# Patient Record
Sex: MALE | Race: WHITE | NOT HISPANIC OR LATINO | Employment: FULL TIME | URBAN - METROPOLITAN AREA
[De-identification: names, ages, dates, MRNs, and addresses within clinical notes are randomized per-mention and may not be internally consistent; named-entity substitution may affect disease eponyms.]

---

## 2024-06-05 ENCOUNTER — PHARMACY VISIT (OUTPATIENT)
Dept: PHARMACY | Facility: MEDICAL CENTER | Age: 29
End: 2024-06-05
Payer: COMMERCIAL

## 2024-06-05 ENCOUNTER — HOSPITAL ENCOUNTER (EMERGENCY)
Facility: MEDICAL CENTER | Age: 29
End: 2024-06-05
Attending: EMERGENCY MEDICINE
Payer: MEDICAID

## 2024-06-05 VITALS
RESPIRATION RATE: 16 BRPM | OXYGEN SATURATION: 97 % | WEIGHT: 127.65 LBS | BODY MASS INDEX: 19.35 KG/M2 | TEMPERATURE: 97 F | DIASTOLIC BLOOD PRESSURE: 75 MMHG | SYSTOLIC BLOOD PRESSURE: 116 MMHG | HEART RATE: 79 BPM | HEIGHT: 68 IN

## 2024-06-05 DIAGNOSIS — L03.012 CELLULITIS OF LEFT THUMB: ICD-10-CM

## 2024-06-05 DIAGNOSIS — L02.512 ABSCESS OF LEFT THUMB: ICD-10-CM

## 2024-06-05 PROCEDURE — 99282 EMERGENCY DEPT VISIT SF MDM: CPT

## 2024-06-05 PROCEDURE — RXMED WILLOW AMBULATORY MEDICATION CHARGE: Performed by: EMERGENCY MEDICINE

## 2024-06-05 RX ORDER — SULFAMETHOXAZOLE AND TRIMETHOPRIM 800; 160 MG/1; MG/1
1 TABLET ORAL 2 TIMES DAILY
Qty: 14 TABLET | Refills: 0 | Status: ACTIVE | OUTPATIENT
Start: 2024-06-05 | End: 2024-06-12

## 2024-06-05 NOTE — ED PROVIDER NOTES
ED Provider Note    CHIEF COMPLAINT  Chief Complaint   Patient presents with    Digit Pain     Pt states he thinks he got a splinter in his left thumb 7 days ago that has gotten progressively worse; moderate swelling noted to left thumb with open wound; pt denies injury       EXTERNAL RECORDS REVIEWED  Other no prior records for review, patient states recently relocated to [place]    HPI/ROS  LIMITATION TO HISTORY   Select: : None  OUTSIDE HISTORIAN(S):  None    Sivakumar Levi is a 28 y.o. male who presents to the emergency department through triage for left thumb pain, swelling.  Patient believes he got a splinter in his thumb nearly 7 days ago, increasing redness, swelling, pain since that time.  There is an open wound on the volar aspect which she states he can occasionally squeeze pus out of.  Pain with range of motion.  Denies fever.  He had similar cutaneous lesion on the left hip for which she also squeezed/expressed pus from last week which resulted in scabbing and since nearly healed per patient.  Denies fever.    PAST MEDICAL HISTORY   Denies    SURGICAL HISTORY   has a past surgical history that includes other.    FAMILY HISTORY  History reviewed. No pertinent family history.    SOCIAL HISTORY  Social History     Tobacco Use    Smoking status: Every Day     Types: Cigarettes    Smokeless tobacco: Never   Vaping Use    Vaping status: Some Days   Substance and Sexual Activity    Alcohol use: Not Currently     Comment: None since 12/17/23; previous hx of ETOH abuse    Drug use: Not Currently    Sexual activity: Not on file       CURRENT MEDICATIONS  Home Medications       Reviewed by Carrie Pruitt R.N. (Registered Nurse) on 06/05/24 at 0306  Med List Status: Not Addressed     Medication Last Dose Status        Patient Baudilio Taking any Medications                           ALLERGIES  No Known Allergies    PHYSICAL EXAM  VITAL SIGNS: /75   Pulse 79   Temp 36.1 °C (97 °F) (Temporal)   Resp 16   Ht  "1.727 m (5' 8\")   Wt 57.9 kg (127 lb 10.3 oz)   SpO2 97%   BMI 19.41 kg/m²    Pulse ox interpretation: I interpret this pulse ox as normal.  Constitutional: Alert in no apparent distress.  HENT: Normocephalic, atraumatic. Bilateral external ears normal, Nose normal.   Eyes: Conjunctiva normal.   Neck: Normal range of motion  Cardiovascular: Normal peripheral perfusion  Thorax & Lungs: Nonlabored respirations  Skin: Warm, Dry   Musculoskeletal: Left thumb is circumferentially swollen, erythematous, warm.  There is a wound on the volar medial aspect of the first phalanx, weeping.  Pain with any flexion or extension at first MTP, PIP.  Less than 2-second capillary refill.  Sensation intact distally. Mild discomfort with palpation over the thenar eminence although no significant cutaneous changes.  (Images taken although did not transfer through media for upload)  Neurologic: Alert and orient x 4.  Speech is clear and cohesive.  Ambulates independently.  Psychiatric: Affect normal, Judgment normal, Mood normal.       EKG/LABS  Refused    RADIOLOGY/PROCEDURES   Refused      COURSE & MEDICAL DECISION MAKING    ASSESSMENT, COURSE AND PLAN  Care Narrative:   Seen evaluated at bedside.  Left thumb infection, suspect abscess, developing tenosynovitis.  Recommended labs, imaging, IV antibiotics and orthopedic consultation.  However patient does not want to be admitted, declines further workup and wishes for antibiotics only.  States he has to work at 930 this morning, he is off on Saturday and Sunday and will return at that time if symptoms persist for further workup.  He is aware that and leaving at this time there is significant risk for worsening infection, permanent disability, loss of this digit or limb otherwise, or even death from sepsis.  He is quite concerned about going to work, getting paid.  Concerns attempted to be alleviated but patient is adamant that he cannot stay at this time.  He is aware that I feel " strongly that if infection goes untreated he may indeed lose his thumb.  He is agreeable to outpatient antibiotics and return when more convenient.       ADDITIONAL PROBLEMS MANAGED      DISPOSITION AND DISCUSSIONS    Discussion of management with other [facility] or appropriate source(s): Pharmacy agrees with Bactrim for soft tissue abscess, even tenosynovitis.      Escalation of care considered, and ultimately not performed:after discussion with the patient / family, they have elected to decline an escalation in care.  Refused.  AMA.    The patient is leaving AGAINST MEDICAL ADVICE.  Return encouraged as soon as possible.  Prescription provided for Bactrim.  Is conversive, reiterates the plan as well as my concerns and leaving at this time, appears to have capacity to make such decisions.      FINAL DIAGNOSIS  1. Cellulitis of left thumb    2. Abscess of left thumb           Electronically signed by: Megha Gunderson D.O., 6/5/2024 5:32 AM

## 2024-06-05 NOTE — ED TRIAGE NOTES
"Chief Complaint   Patient presents with    Digit Pain     Pt states he thinks he got a splinter in his left thumb 7 days ago that has gotten progressively worse; moderate swelling noted to left thumb with open wound; pt denies injury     Pt ambulatory to triage for above complaint. Pt states he also has similar wounds to his left hip that \"started out looking like pimples but turned into craters.\" Those wounds do not look infected at this time. Pt has hx of MRSA infection and is concerned that this might be MRSA.     Pt is alert/oriented and follows commands. Pt speaking in full sentences and responds appropriately to questions. No acute distress noted in triage and respirations are even and unlabored.     Pt placed in lobby and educated on triage process. Pt encouraged to alert staff for any changes in condition.  "

## 2024-06-05 NOTE — DISCHARGE INSTRUCTIONS
You are leaving the hospital AGAINST MEDICAL ADVICE.  Doing so without further workup and specialty consultation and operative intervention you risk permanent disability, loss of limb or life.    You may return at any time for further evaluation and encouraged to do so as soon as possible.    Bactrim twice daily for 7 days.

## 2024-06-05 NOTE — ED NOTES
"Pt discharged home, pt left and signed AMA form. Pt educated on the risks of leaving AMA, pt verbalized understanding. Pt alert and oriented, on room air, steady gait. pt in possession of belongings. Pt provided discharge education and information pertaining to medications and follow up appointments. Pt received copy of discharge instructions and verbalized understanding. /75   Pulse 79   Temp 36.1 °C (97 °F) (Temporal)   Resp 16   Ht 1.727 m (5' 8\")   Wt 57.9 kg (127 lb 10.3 oz)   SpO2 97%   BMI 19.41 kg/m²     "

## 2024-06-11 ENCOUNTER — ANESTHESIA EVENT (OUTPATIENT)
Dept: SURGERY | Facility: MEDICAL CENTER | Age: 29
DRG: 603 | End: 2024-06-11
Payer: MEDICAID

## 2024-06-11 ENCOUNTER — APPOINTMENT (OUTPATIENT)
Dept: RADIOLOGY | Facility: MEDICAL CENTER | Age: 29
DRG: 603 | End: 2024-06-11
Attending: EMERGENCY MEDICINE
Payer: MEDICAID

## 2024-06-11 ENCOUNTER — ANESTHESIA (OUTPATIENT)
Dept: SURGERY | Facility: MEDICAL CENTER | Age: 29
DRG: 603 | End: 2024-06-11
Payer: MEDICAID

## 2024-06-11 ENCOUNTER — HOSPITAL ENCOUNTER (INPATIENT)
Facility: MEDICAL CENTER | Age: 29
LOS: 1 days | DRG: 603 | End: 2024-06-12
Attending: EMERGENCY MEDICINE | Admitting: HOSPITALIST
Payer: MEDICAID

## 2024-06-11 DIAGNOSIS — S61.002A OPEN WOUND OF LEFT THUMB, INITIAL ENCOUNTER: ICD-10-CM

## 2024-06-11 DIAGNOSIS — L03.012 CELLULITIS OF LEFT THUMB: ICD-10-CM

## 2024-06-11 DIAGNOSIS — F10.920 ALCOHOLIC INTOXICATION WITHOUT COMPLICATION (HCC): ICD-10-CM

## 2024-06-11 PROBLEM — L02.512 ABSCESS OF LEFT THUMB: Status: ACTIVE | Noted: 2024-06-11

## 2024-06-11 PROBLEM — F10.20 ALCOHOL DEPENDENCE (HCC): Status: ACTIVE | Noted: 2024-06-11

## 2024-06-11 PROBLEM — E87.20 LACTIC ACIDOSIS: Status: ACTIVE | Noted: 2024-06-11

## 2024-06-11 LAB
ALBUMIN SERPL BCP-MCNC: 4.2 G/DL (ref 3.2–4.9)
ALBUMIN/GLOB SERPL: 1.4 G/DL
ALP SERPL-CCNC: 108 U/L (ref 30–99)
ALT SERPL-CCNC: 17 U/L (ref 2–50)
ANION GAP SERPL CALC-SCNC: 19 MMOL/L (ref 7–16)
AST SERPL-CCNC: 33 U/L (ref 12–45)
BASOPHILS # BLD AUTO: 0.6 % (ref 0–1.8)
BASOPHILS # BLD: 0.05 K/UL (ref 0–0.12)
BILIRUB SERPL-MCNC: 0.3 MG/DL (ref 0.1–1.5)
BUN SERPL-MCNC: 13 MG/DL (ref 8–22)
CALCIUM ALBUM COR SERPL-MCNC: 9.3 MG/DL (ref 8.5–10.5)
CALCIUM SERPL-MCNC: 9.5 MG/DL (ref 8.5–10.5)
CHLORIDE SERPL-SCNC: 103 MMOL/L (ref 96–112)
CO2 SERPL-SCNC: 19 MMOL/L (ref 20–33)
CREAT SERPL-MCNC: 0.69 MG/DL (ref 0.5–1.4)
EOSINOPHIL # BLD AUTO: 0.07 K/UL (ref 0–0.51)
EOSINOPHIL NFR BLD: 0.8 % (ref 0–6.9)
ERYTHROCYTE [DISTWIDTH] IN BLOOD BY AUTOMATED COUNT: 41.5 FL (ref 35.9–50)
ETHANOL BLD-MCNC: 302.2 MG/DL
FUNGUS SPEC FUNGUS STN: NORMAL
GFR SERPLBLD CREATININE-BSD FMLA CKD-EPI: 130 ML/MIN/1.73 M 2
GLOBULIN SER CALC-MCNC: 3.1 G/DL (ref 1.9–3.5)
GLUCOSE SERPL-MCNC: 90 MG/DL (ref 65–99)
GRAM STN SPEC: NORMAL
HCT VFR BLD AUTO: 46.8 % (ref 42–52)
HGB BLD-MCNC: 16.5 G/DL (ref 14–18)
IMM GRANULOCYTES # BLD AUTO: 0.02 K/UL (ref 0–0.11)
IMM GRANULOCYTES NFR BLD AUTO: 0.2 % (ref 0–0.9)
LACTATE SERPL-SCNC: 1.8 MMOL/L (ref 0.5–2)
LACTATE SERPL-SCNC: 2.7 MMOL/L (ref 0.5–2)
LACTATE SERPL-SCNC: 3.1 MMOL/L (ref 0.5–2)
LACTATE SERPL-SCNC: 3.7 MMOL/L (ref 0.5–2)
LYMPHOCYTES # BLD AUTO: 3.89 K/UL (ref 1–4.8)
LYMPHOCYTES NFR BLD: 45.3 % (ref 22–41)
MCH RBC QN AUTO: 30.6 PG (ref 27–33)
MCHC RBC AUTO-ENTMCNC: 35.3 G/DL (ref 32.3–36.5)
MCV RBC AUTO: 86.7 FL (ref 81.4–97.8)
MONOCYTES # BLD AUTO: 0.39 K/UL (ref 0–0.85)
MONOCYTES NFR BLD AUTO: 4.5 % (ref 0–13.4)
NEUTROPHILS # BLD AUTO: 4.17 K/UL (ref 1.82–7.42)
NEUTROPHILS NFR BLD: 48.6 % (ref 44–72)
NRBC # BLD AUTO: 0 K/UL
NRBC BLD-RTO: 0 /100 WBC (ref 0–0.2)
PLATELET # BLD AUTO: 331 K/UL (ref 164–446)
PMV BLD AUTO: 10.2 FL (ref 9–12.9)
POTASSIUM SERPL-SCNC: 3.8 MMOL/L (ref 3.6–5.5)
PROT SERPL-MCNC: 7.3 G/DL (ref 6–8.2)
RBC # BLD AUTO: 5.4 M/UL (ref 4.7–6.1)
SCCMEC + MECA PNL NOSE NAA+PROBE: NEGATIVE
SIGNIFICANT IND 70042: NORMAL
SIGNIFICANT IND 70042: NORMAL
SITE SITE: NORMAL
SITE SITE: NORMAL
SODIUM SERPL-SCNC: 141 MMOL/L (ref 135–145)
SOURCE SOURCE: NORMAL
SOURCE SOURCE: NORMAL
WBC # BLD AUTO: 8.6 K/UL (ref 4.8–10.8)

## 2024-06-11 PROCEDURE — 700101 HCHG RX REV CODE 250: Performed by: STUDENT IN AN ORGANIZED HEALTH CARE EDUCATION/TRAINING PROGRAM

## 2024-06-11 PROCEDURE — 700102 HCHG RX REV CODE 250 W/ 637 OVERRIDE(OP): Performed by: HOSPITALIST

## 2024-06-11 PROCEDURE — 96366 THER/PROPH/DIAG IV INF ADDON: CPT

## 2024-06-11 PROCEDURE — 87205 SMEAR GRAM STAIN: CPT

## 2024-06-11 PROCEDURE — HZ2ZZZZ DETOXIFICATION SERVICES FOR SUBSTANCE ABUSE TREATMENT: ICD-10-PCS | Performed by: HOSPITALIST

## 2024-06-11 PROCEDURE — 87040 BLOOD CULTURE FOR BACTERIA: CPT

## 2024-06-11 PROCEDURE — 83605 ASSAY OF LACTIC ACID: CPT

## 2024-06-11 PROCEDURE — 99291 CRITICAL CARE FIRST HOUR: CPT

## 2024-06-11 PROCEDURE — 85025 COMPLETE CBC W/AUTO DIFF WBC: CPT

## 2024-06-11 PROCEDURE — 87077 CULTURE AEROBIC IDENTIFY: CPT

## 2024-06-11 PROCEDURE — 99222 1ST HOSP IP/OBS MODERATE 55: CPT | Mod: 57 | Performed by: STUDENT IN AN ORGANIZED HEALTH CARE EDUCATION/TRAINING PROGRAM

## 2024-06-11 PROCEDURE — 73130 X-RAY EXAM OF HAND: CPT | Mod: LT

## 2024-06-11 PROCEDURE — 700102 HCHG RX REV CODE 250 W/ 637 OVERRIDE(OP): Performed by: EMERGENCY MEDICINE

## 2024-06-11 PROCEDURE — 96375 TX/PRO/DX INJ NEW DRUG ADDON: CPT

## 2024-06-11 PROCEDURE — 36415 COLL VENOUS BLD VENIPUNCTURE: CPT

## 2024-06-11 PROCEDURE — 160009 HCHG ANES TIME/MIN: Performed by: STUDENT IN AN ORGANIZED HEALTH CARE EDUCATION/TRAINING PROGRAM

## 2024-06-11 PROCEDURE — 160036 HCHG PACU - EA ADDL 30 MINS PHASE I: Performed by: STUDENT IN AN ORGANIZED HEALTH CARE EDUCATION/TRAINING PROGRAM

## 2024-06-11 PROCEDURE — 87086 URINE CULTURE/COLONY COUNT: CPT

## 2024-06-11 PROCEDURE — A9270 NON-COVERED ITEM OR SERVICE: HCPCS | Performed by: HOSPITALIST

## 2024-06-11 PROCEDURE — 87102 FUNGUS ISOLATION CULTURE: CPT

## 2024-06-11 PROCEDURE — 99223 1ST HOSP IP/OBS HIGH 75: CPT | Performed by: HOSPITALIST

## 2024-06-11 PROCEDURE — 700105 HCHG RX REV CODE 258: Performed by: HOSPITALIST

## 2024-06-11 PROCEDURE — 96367 TX/PROPH/DG ADDL SEQ IV INF: CPT

## 2024-06-11 PROCEDURE — 700111 HCHG RX REV CODE 636 W/ 250 OVERRIDE (IP): Performed by: HOSPITALIST

## 2024-06-11 PROCEDURE — 87186 SC STD MICRODIL/AGAR DIL: CPT

## 2024-06-11 PROCEDURE — 87070 CULTURE OTHR SPECIMN AEROBIC: CPT

## 2024-06-11 PROCEDURE — A9270 NON-COVERED ITEM OR SERVICE: HCPCS | Performed by: EMERGENCY MEDICINE

## 2024-06-11 PROCEDURE — 160048 HCHG OR STATISTICAL LEVEL 1-5: Performed by: STUDENT IN AN ORGANIZED HEALTH CARE EDUCATION/TRAINING PROGRAM

## 2024-06-11 PROCEDURE — 160027 HCHG SURGERY MINUTES - 1ST 30 MINS LEVEL 2: Performed by: STUDENT IN AN ORGANIZED HEALTH CARE EDUCATION/TRAINING PROGRAM

## 2024-06-11 PROCEDURE — 0X9K0ZZ DRAINAGE OF LEFT HAND, OPEN APPROACH: ICD-10-PCS | Performed by: STUDENT IN AN ORGANIZED HEALTH CARE EDUCATION/TRAINING PROGRAM

## 2024-06-11 PROCEDURE — 700105 HCHG RX REV CODE 258: Mod: UD | Performed by: EMERGENCY MEDICINE

## 2024-06-11 PROCEDURE — 87641 MR-STAPH DNA AMP PROBE: CPT

## 2024-06-11 PROCEDURE — 770001 HCHG ROOM/CARE - MED/SURG/GYN PRIV*

## 2024-06-11 PROCEDURE — 26020 DRAIN HAND TENDON SHEATH: CPT | Mod: FA | Performed by: STUDENT IN AN ORGANIZED HEALTH CARE EDUCATION/TRAINING PROGRAM

## 2024-06-11 PROCEDURE — 700105 HCHG RX REV CODE 258: Performed by: STUDENT IN AN ORGANIZED HEALTH CARE EDUCATION/TRAINING PROGRAM

## 2024-06-11 PROCEDURE — 160035 HCHG PACU - 1ST 60 MINS PHASE I: Performed by: STUDENT IN AN ORGANIZED HEALTH CARE EDUCATION/TRAINING PROGRAM

## 2024-06-11 PROCEDURE — 700111 HCHG RX REV CODE 636 W/ 250 OVERRIDE (IP): Mod: JZ | Performed by: EMERGENCY MEDICINE

## 2024-06-11 PROCEDURE — 87076 CULTURE ANAEROBE IDENT EACH: CPT

## 2024-06-11 PROCEDURE — 87015 SPECIMEN INFECT AGNT CONCNTJ: CPT

## 2024-06-11 PROCEDURE — 80053 COMPREHEN METABOLIC PANEL: CPT

## 2024-06-11 PROCEDURE — 82077 ASSAY SPEC XCP UR&BREATH IA: CPT

## 2024-06-11 PROCEDURE — 96365 THER/PROPH/DIAG IV INF INIT: CPT

## 2024-06-11 PROCEDURE — 700111 HCHG RX REV CODE 636 W/ 250 OVERRIDE (IP): Performed by: STUDENT IN AN ORGANIZED HEALTH CARE EDUCATION/TRAINING PROGRAM

## 2024-06-11 PROCEDURE — 160002 HCHG RECOVERY MINUTES (STAT): Performed by: STUDENT IN AN ORGANIZED HEALTH CARE EDUCATION/TRAINING PROGRAM

## 2024-06-11 PROCEDURE — 87075 CULTR BACTERIA EXCEPT BLOOD: CPT

## 2024-06-11 RX ORDER — MIDAZOLAM HYDROCHLORIDE 1 MG/ML
INJECTION INTRAMUSCULAR; INTRAVENOUS PRN
Status: DISCONTINUED | OUTPATIENT
Start: 2024-06-11 | End: 2024-06-11 | Stop reason: SURG

## 2024-06-11 RX ORDER — AMOXICILLIN 250 MG
2 CAPSULE ORAL EVERY EVENING
Status: DISCONTINUED | OUTPATIENT
Start: 2024-06-11 | End: 2024-06-12 | Stop reason: HOSPADM

## 2024-06-11 RX ORDER — DEXAMETHASONE SODIUM PHOSPHATE 4 MG/ML
INJECTION, SOLUTION INTRA-ARTICULAR; INTRALESIONAL; INTRAMUSCULAR; INTRAVENOUS; SOFT TISSUE PRN
Status: DISCONTINUED | OUTPATIENT
Start: 2024-06-11 | End: 2024-06-11 | Stop reason: SURG

## 2024-06-11 RX ORDER — KETOROLAC TROMETHAMINE 15 MG/ML
15 INJECTION, SOLUTION INTRAMUSCULAR; INTRAVENOUS ONCE
Status: COMPLETED | OUTPATIENT
Start: 2024-06-11 | End: 2024-06-11

## 2024-06-11 RX ORDER — DIAZEPAM 10 MG/2ML
10 INJECTION, SOLUTION INTRAMUSCULAR; INTRAVENOUS
Status: DISCONTINUED | OUTPATIENT
Start: 2024-06-11 | End: 2024-06-12 | Stop reason: HOSPADM

## 2024-06-11 RX ORDER — HYDROMORPHONE HYDROCHLORIDE 1 MG/ML
0.4 INJECTION, SOLUTION INTRAMUSCULAR; INTRAVENOUS; SUBCUTANEOUS
Status: DISCONTINUED | OUTPATIENT
Start: 2024-06-11 | End: 2024-06-11 | Stop reason: HOSPADM

## 2024-06-11 RX ORDER — LABETALOL HYDROCHLORIDE 5 MG/ML
10 INJECTION, SOLUTION INTRAVENOUS EVERY 4 HOURS PRN
Status: DISCONTINUED | OUTPATIENT
Start: 2024-06-11 | End: 2024-06-12 | Stop reason: HOSPADM

## 2024-06-11 RX ORDER — CHLORDIAZEPOXIDE HYDROCHLORIDE 25 MG/1
50 CAPSULE, GELATIN COATED ORAL EVERY 6 HOURS
Status: DISCONTINUED | OUTPATIENT
Start: 2024-06-11 | End: 2024-06-12 | Stop reason: HOSPADM

## 2024-06-11 RX ORDER — HYDROMORPHONE HYDROCHLORIDE 1 MG/ML
0.1 INJECTION, SOLUTION INTRAMUSCULAR; INTRAVENOUS; SUBCUTANEOUS
Status: DISCONTINUED | OUTPATIENT
Start: 2024-06-11 | End: 2024-06-11 | Stop reason: HOSPADM

## 2024-06-11 RX ORDER — OXYCODONE HYDROCHLORIDE 5 MG/1
5 TABLET ORAL
Status: DISCONTINUED | OUTPATIENT
Start: 2024-06-11 | End: 2024-06-12 | Stop reason: HOSPADM

## 2024-06-11 RX ORDER — POLYETHYLENE GLYCOL 3350 17 G/17G
1 POWDER, FOR SOLUTION ORAL
Status: DISCONTINUED | OUTPATIENT
Start: 2024-06-11 | End: 2024-06-12 | Stop reason: HOSPADM

## 2024-06-11 RX ORDER — ONDANSETRON 2 MG/ML
4 INJECTION INTRAMUSCULAR; INTRAVENOUS
Status: DISCONTINUED | OUTPATIENT
Start: 2024-06-11 | End: 2024-06-11 | Stop reason: HOSPADM

## 2024-06-11 RX ORDER — CHLORDIAZEPOXIDE HYDROCHLORIDE 25 MG/1
25 CAPSULE, GELATIN COATED ORAL EVERY 6 HOURS
Status: DISCONTINUED | OUTPATIENT
Start: 2024-06-12 | End: 2024-06-12 | Stop reason: HOSPADM

## 2024-06-11 RX ORDER — SODIUM CHLORIDE, SODIUM LACTATE, POTASSIUM CHLORIDE, AND CALCIUM CHLORIDE .6; .31; .03; .02 G/100ML; G/100ML; G/100ML; G/100ML
500 INJECTION, SOLUTION INTRAVENOUS
Status: DISCONTINUED | OUTPATIENT
Start: 2024-06-11 | End: 2024-06-12 | Stop reason: HOSPADM

## 2024-06-11 RX ORDER — LIDOCAINE HYDROCHLORIDE 20 MG/ML
INJECTION, SOLUTION EPIDURAL; INFILTRATION; INTRACAUDAL; PERINEURAL PRN
Status: DISCONTINUED | OUTPATIENT
Start: 2024-06-11 | End: 2024-06-11 | Stop reason: SURG

## 2024-06-11 RX ORDER — ACETAMINOPHEN 325 MG/1
650 TABLET ORAL EVERY 6 HOURS PRN
Status: DISCONTINUED | OUTPATIENT
Start: 2024-06-11 | End: 2024-06-12 | Stop reason: HOSPADM

## 2024-06-11 RX ORDER — ONDANSETRON 2 MG/ML
INJECTION INTRAMUSCULAR; INTRAVENOUS PRN
Status: DISCONTINUED | OUTPATIENT
Start: 2024-06-11 | End: 2024-06-11 | Stop reason: SURG

## 2024-06-11 RX ORDER — LORAZEPAM 1 MG/1
1 TABLET ORAL EVERY 4 HOURS PRN
Status: DISCONTINUED | OUTPATIENT
Start: 2024-06-11 | End: 2024-06-12 | Stop reason: HOSPADM

## 2024-06-11 RX ORDER — HYDROMORPHONE HYDROCHLORIDE 1 MG/ML
0.2 INJECTION, SOLUTION INTRAMUSCULAR; INTRAVENOUS; SUBCUTANEOUS
Status: DISCONTINUED | OUTPATIENT
Start: 2024-06-11 | End: 2024-06-11 | Stop reason: HOSPADM

## 2024-06-11 RX ORDER — LORAZEPAM 2 MG/1
4 TABLET ORAL
Status: DISCONTINUED | OUTPATIENT
Start: 2024-06-11 | End: 2024-06-12 | Stop reason: HOSPADM

## 2024-06-11 RX ORDER — SODIUM CHLORIDE 9 MG/ML
INJECTION, SOLUTION INTRAVENOUS CONTINUOUS
Status: DISCONTINUED | OUTPATIENT
Start: 2024-06-11 | End: 2024-06-12 | Stop reason: HOSPADM

## 2024-06-11 RX ORDER — SODIUM CHLORIDE, SODIUM LACTATE, POTASSIUM CHLORIDE, AND CALCIUM CHLORIDE .6; .31; .03; .02 G/100ML; G/100ML; G/100ML; G/100ML
30 INJECTION, SOLUTION INTRAVENOUS ONCE
Status: DISCONTINUED | OUTPATIENT
Start: 2024-06-11 | End: 2024-06-11

## 2024-06-11 RX ORDER — SODIUM CHLORIDE 9 MG/ML
30 INJECTION, SOLUTION INTRAVENOUS ONCE
Status: COMPLETED | OUTPATIENT
Start: 2024-06-11 | End: 2024-06-11

## 2024-06-11 RX ORDER — NICOTINE 21 MG/24HR
14 PATCH, TRANSDERMAL 24 HOURS TRANSDERMAL
Status: DISCONTINUED | OUTPATIENT
Start: 2024-06-11 | End: 2024-06-11

## 2024-06-11 RX ORDER — PHENYLEPHRINE HCL IN 0.9% NACL 1 MG/10 ML
SYRINGE (ML) INTRAVENOUS PRN
Status: DISCONTINUED | OUTPATIENT
Start: 2024-06-11 | End: 2024-06-11 | Stop reason: SURG

## 2024-06-11 RX ORDER — HALOPERIDOL 5 MG/ML
1 INJECTION INTRAMUSCULAR
Status: DISCONTINUED | OUTPATIENT
Start: 2024-06-11 | End: 2024-06-11 | Stop reason: HOSPADM

## 2024-06-11 RX ORDER — NICOTINE 21 MG/24HR
14 PATCH, TRANSDERMAL 24 HOURS TRANSDERMAL
Status: DISCONTINUED | OUTPATIENT
Start: 2024-06-11 | End: 2024-06-12 | Stop reason: HOSPADM

## 2024-06-11 RX ORDER — LORAZEPAM 1 MG/1
0.5 TABLET ORAL EVERY 4 HOURS PRN
Status: DISCONTINUED | OUTPATIENT
Start: 2024-06-11 | End: 2024-06-12 | Stop reason: HOSPADM

## 2024-06-11 RX ORDER — SODIUM CHLORIDE, SODIUM LACTATE, POTASSIUM CHLORIDE, CALCIUM CHLORIDE 600; 310; 30; 20 MG/100ML; MG/100ML; MG/100ML; MG/100ML
INJECTION, SOLUTION INTRAVENOUS
Status: DISCONTINUED | OUTPATIENT
Start: 2024-06-11 | End: 2024-06-11 | Stop reason: SURG

## 2024-06-11 RX ORDER — GAUZE BANDAGE 2" X 2"
100 BANDAGE TOPICAL DAILY
Status: DISCONTINUED | OUTPATIENT
Start: 2024-06-11 | End: 2024-06-12 | Stop reason: HOSPADM

## 2024-06-11 RX ORDER — HYDROMORPHONE HYDROCHLORIDE 1 MG/ML
0.25 INJECTION, SOLUTION INTRAMUSCULAR; INTRAVENOUS; SUBCUTANEOUS
Status: DISCONTINUED | OUTPATIENT
Start: 2024-06-11 | End: 2024-06-12 | Stop reason: HOSPADM

## 2024-06-11 RX ORDER — LORAZEPAM 2 MG/1
2 TABLET ORAL
Status: DISCONTINUED | OUTPATIENT
Start: 2024-06-11 | End: 2024-06-12 | Stop reason: HOSPADM

## 2024-06-11 RX ORDER — FOLIC ACID 1 MG/1
1 TABLET ORAL DAILY
Status: DISCONTINUED | OUTPATIENT
Start: 2024-06-11 | End: 2024-06-12 | Stop reason: HOSPADM

## 2024-06-11 RX ORDER — OXYCODONE HYDROCHLORIDE 5 MG/1
2.5 TABLET ORAL
Status: DISCONTINUED | OUTPATIENT
Start: 2024-06-11 | End: 2024-06-12 | Stop reason: HOSPADM

## 2024-06-11 RX ORDER — KETOROLAC TROMETHAMINE 15 MG/ML
INJECTION, SOLUTION INTRAMUSCULAR; INTRAVENOUS PRN
Status: DISCONTINUED | OUTPATIENT
Start: 2024-06-11 | End: 2024-06-11 | Stop reason: SURG

## 2024-06-11 RX ADMIN — MIDAZOLAM HYDROCHLORIDE 2 MG: 2 INJECTION, SOLUTION INTRAMUSCULAR; INTRAVENOUS at 16:48

## 2024-06-11 RX ADMIN — NICOTINE 14 MG: 14 PATCH TRANSDERMAL at 09:41

## 2024-06-11 RX ADMIN — VANCOMYCIN HYDROCHLORIDE 1000 MG: 5 INJECTION, POWDER, LYOPHILIZED, FOR SOLUTION INTRAVENOUS at 20:50

## 2024-06-11 RX ADMIN — PROPOFOL 180 MG: 10 INJECTION, EMULSION INTRAVENOUS at 16:48

## 2024-06-11 RX ADMIN — ONDANSETRON 4 MG: 2 INJECTION INTRAMUSCULAR; INTRAVENOUS at 16:53

## 2024-06-11 RX ADMIN — FOLIC ACID 1 MG: 1 TABLET ORAL at 10:20

## 2024-06-11 RX ADMIN — NICOTINE POLACRILEX 2 MG: 2 GUM, CHEWING BUCCAL at 22:18

## 2024-06-11 RX ADMIN — THERA TABS 1 TABLET: TAB at 10:20

## 2024-06-11 RX ADMIN — AMPICILLIN AND SULBACTAM 3 G: 1; 2 INJECTION, POWDER, FOR SOLUTION INTRAMUSCULAR; INTRAVENOUS at 07:45

## 2024-06-11 RX ADMIN — Medication 100 MG: at 10:20

## 2024-06-11 RX ADMIN — SODIUM CHLORIDE 1614 ML: 9 INJECTION, SOLUTION INTRAVENOUS at 08:22

## 2024-06-11 RX ADMIN — NICOTINE POLACRILEX 2 MG: 2 GUM, CHEWING BUCCAL at 21:15

## 2024-06-11 RX ADMIN — LORAZEPAM 1 MG: 1 TABLET ORAL at 22:18

## 2024-06-11 RX ADMIN — DEXAMETHASONE SODIUM PHOSPHATE 4 MG: 4 INJECTION INTRA-ARTICULAR; INTRALESIONAL; INTRAMUSCULAR; INTRAVENOUS; SOFT TISSUE at 16:53

## 2024-06-11 RX ADMIN — SODIUM CHLORIDE: 9 INJECTION, SOLUTION INTRAVENOUS at 20:29

## 2024-06-11 RX ADMIN — FENTANYL CITRATE 50 MCG: 50 INJECTION, SOLUTION INTRAMUSCULAR; INTRAVENOUS at 16:48

## 2024-06-11 RX ADMIN — VANCOMYCIN HYDROCHLORIDE 1 G: 1 INJECTION, POWDER, LYOPHILIZED, FOR SOLUTION INTRAVENOUS at 16:53

## 2024-06-11 RX ADMIN — CHLORDIAZEPOXIDE HYDROCHLORIDE 50 MG: 25 CAPSULE ORAL at 11:35

## 2024-06-11 RX ADMIN — CHLORDIAZEPOXIDE HYDROCHLORIDE 50 MG: 25 CAPSULE ORAL at 23:27

## 2024-06-11 RX ADMIN — LIDOCAINE HYDROCHLORIDE 100 MG: 20 INJECTION, SOLUTION EPIDURAL; INFILTRATION; INTRACAUDAL at 16:48

## 2024-06-11 RX ADMIN — SODIUM CHLORIDE, POTASSIUM CHLORIDE, SODIUM LACTATE AND CALCIUM CHLORIDE: 600; 310; 30; 20 INJECTION, SOLUTION INTRAVENOUS at 16:45

## 2024-06-11 RX ADMIN — VANCOMYCIN HYDROCHLORIDE 1250 MG: 5 INJECTION, POWDER, LYOPHILIZED, FOR SOLUTION INTRAVENOUS at 08:22

## 2024-06-11 RX ADMIN — KETOROLAC TROMETHAMINE 15 MG: 15 INJECTION, SOLUTION INTRAMUSCULAR; INTRAVENOUS at 17:00

## 2024-06-11 RX ADMIN — NICOTINE 14 MG: 14 PATCH TRANSDERMAL at 22:11

## 2024-06-11 RX ADMIN — LORAZEPAM 2 MG: 2 TABLET ORAL at 10:38

## 2024-06-11 RX ADMIN — SODIUM CHLORIDE: 9 INJECTION, SOLUTION INTRAVENOUS at 10:25

## 2024-06-11 RX ADMIN — Medication 100 MCG: at 16:55

## 2024-06-11 RX ADMIN — KETOROLAC TROMETHAMINE 15 MG: 15 INJECTION, SOLUTION INTRAMUSCULAR; INTRAVENOUS at 09:41

## 2024-06-11 SDOH — ECONOMIC STABILITY: TRANSPORTATION INSECURITY
IN THE PAST 12 MONTHS, HAS LACK OF RELIABLE TRANSPORTATION KEPT YOU FROM MEDICAL APPOINTMENTS, MEETINGS, WORK OR FROM GETTING THINGS NEEDED FOR DAILY LIVING?: NO

## 2024-06-11 SDOH — ECONOMIC STABILITY: TRANSPORTATION INSECURITY
IN THE PAST 12 MONTHS, HAS THE LACK OF TRANSPORTATION KEPT YOU FROM MEDICAL APPOINTMENTS OR FROM GETTING MEDICATIONS?: NO

## 2024-06-11 ASSESSMENT — LIFESTYLE VARIABLES
HEADACHE, FULLNESS IN HEAD: NOT PRESENT
VISUAL DISTURBANCES: VERY MILD SENSITIVITY
ANXIETY: NO ANXIETY (AT EASE)
PAROXYSMAL SWEATS: NO SWEAT VISIBLE
ORIENTATION AND CLOUDING OF SENSORIUM: ORIENTED AND CAN DO SERIAL ADDITIONS
ORIENTATION AND CLOUDING OF SENSORIUM: ORIENTED AND CAN DO SERIAL ADDITIONS
ALCOHOL_USE: YES
VISUAL DISTURBANCES: NOT PRESENT
TOTAL SCORE: 4
CONSUMPTION TOTAL: POSITIVE
ANXIETY: *
TREMOR: NO TREMOR
DOES PATIENT WANT TO STOP DRINKING: NO
ON A TYPICAL DAY WHEN YOU DRINK ALCOHOL HOW MANY DRINKS DO YOU HAVE: 2
VISUAL DISTURBANCES: NOT PRESENT
HEADACHE, FULLNESS IN HEAD: MILD
TOTAL SCORE: 0
EVER FELT BAD OR GUILTY ABOUT YOUR DRINKING: NO
NAUSEA AND VOMITING: MILD NAUSEA WITH NO VOMITING
HAVE YOU EVER FELT YOU SHOULD CUT DOWN ON YOUR DRINKING: NO
PAROXYSMAL SWEATS: BARELY PERCEPTIBLE SWEATING. PALMS MOIST
AGITATION: NORMAL ACTIVITY
NAUSEA AND VOMITING: NO NAUSEA AND NO VOMITING
ANXIETY: MODERATELY ANXIOUS OR GUARDED, SO ANXIETY IS INFERRED
TREMOR: *
ORIENTATION AND CLOUDING OF SENSORIUM: ORIENTED AND CAN DO SERIAL ADDITIONS
AUDITORY DISTURBANCES: NOT PRESENT
NAUSEA AND VOMITING: NO NAUSEA AND NO VOMITING
VISUAL DISTURBANCES: NOT PRESENT
NAUSEA AND VOMITING: NO NAUSEA AND NO VOMITING
HAVE PEOPLE ANNOYED YOU BY CRITICIZING YOUR DRINKING: NO
AVERAGE NUMBER OF DAYS PER WEEK YOU HAVE A DRINK CONTAINING ALCOHOL: 5
HEADACHE, FULLNESS IN HEAD: NOT PRESENT
AGITATION: NORMAL ACTIVITY
TREMOR: *
AGITATION: MODERATELY FIDGETY AND RESTLESS
NAUSEA AND VOMITING: NO NAUSEA AND NO VOMITING
EVER HAD A DRINK FIRST THING IN THE MORNING TO STEADY YOUR NERVES TO GET RID OF A HANGOVER: NO
TREMOR: NO TREMOR
HEADACHE, FULLNESS IN HEAD: NOT PRESENT
HEADACHE, FULLNESS IN HEAD: NOT PRESENT
VISUAL DISTURBANCES: NOT PRESENT
ANXIETY: *
AUDITORY DISTURBANCES: NOT PRESENT
TOTAL SCORE: 10
TREMOR: *
PAROXYSMAL SWEATS: BARELY PERCEPTIBLE SWEATING. PALMS MOIST
ANXIETY: NO ANXIETY (AT EASE)
PAROXYSMAL SWEATS: NO SWEAT VISIBLE
AUDITORY DISTURBANCES: NOT PRESENT
TOTAL SCORE: 1
ORIENTATION AND CLOUDING OF SENSORIUM: ORIENTED AND CAN DO SERIAL ADDITIONS
TOTAL SCORE: 0
HOW MANY TIMES IN THE PAST YEAR HAVE YOU HAD 5 OR MORE DRINKS IN A DAY: 2
TOTAL SCORE: 12
TOTAL SCORE: 0
PAROXYSMAL SWEATS: BARELY PERCEPTIBLE SWEATING. PALMS MOIST
TOTAL SCORE: 0
AUDITORY DISTURBANCES: NOT PRESENT
AUDITORY DISTURBANCES: NOT PRESENT
AGITATION: *
ORIENTATION AND CLOUDING OF SENSORIUM: ORIENTED AND CAN DO SERIAL ADDITIONS
AGITATION: NORMAL ACTIVITY

## 2024-06-11 ASSESSMENT — COGNITIVE AND FUNCTIONAL STATUS - GENERAL
DRESSING REGULAR LOWER BODY CLOTHING: A LITTLE
MOBILITY SCORE: 17
WALKING IN HOSPITAL ROOM: A LOT
HELP NEEDED FOR BATHING: A LITTLE
DAILY ACTIVITIY SCORE: 20
MOVING FROM LYING ON BACK TO SITTING ON SIDE OF FLAT BED: A LITTLE
TOILETING: A LITTLE
DRESSING REGULAR UPPER BODY CLOTHING: A LITTLE
CLIMB 3 TO 5 STEPS WITH RAILING: A LOT
SUGGESTED CMS G CODE MODIFIER DAILY ACTIVITY: CJ
MOVING TO AND FROM BED TO CHAIR: A LITTLE
STANDING UP FROM CHAIR USING ARMS: A LITTLE
SUGGESTED CMS G CODE MODIFIER MOBILITY: CK

## 2024-06-11 ASSESSMENT — PATIENT HEALTH QUESTIONNAIRE - PHQ9
1. LITTLE INTEREST OR PLEASURE IN DOING THINGS: NOT AT ALL
SUM OF ALL RESPONSES TO PHQ9 QUESTIONS 1 AND 2: 0
SUM OF ALL RESPONSES TO PHQ9 QUESTIONS 1 AND 2: 0
1. LITTLE INTEREST OR PLEASURE IN DOING THINGS: NOT AT ALL
2. FEELING DOWN, DEPRESSED, IRRITABLE, OR HOPELESS: NOT AT ALL
2. FEELING DOWN, DEPRESSED, IRRITABLE, OR HOPELESS: NOT AT ALL

## 2024-06-11 ASSESSMENT — SOCIAL DETERMINANTS OF HEALTH (SDOH)
WITHIN THE PAST 12 MONTHS, YOU WORRIED THAT YOUR FOOD WOULD RUN OUT BEFORE YOU GOT THE MONEY TO BUY MORE: OFTEN TRUE
WITHIN THE LAST YEAR, HAVE YOU BEEN AFRAID OF YOUR PARTNER OR EX-PARTNER?: NO
WITHIN THE LAST YEAR, HAVE YOU BEEN KICKED, HIT, SLAPPED, OR OTHERWISE PHYSICALLY HURT BY YOUR PARTNER OR EX-PARTNER?: NO
WITHIN THE PAST 12 MONTHS, THE FOOD YOU BOUGHT JUST DIDN'T LAST AND YOU DIDN'T HAVE MONEY TO GET MORE: SOMETIMES TRUE
WITHIN THE LAST YEAR, HAVE YOU BEEN HUMILIATED OR EMOTIONALLY ABUSED IN OTHER WAYS BY YOUR PARTNER OR EX-PARTNER?: NO
IN THE PAST 12 MONTHS, HAS THE ELECTRIC, GAS, OIL, OR WATER COMPANY THREATENED TO SHUT OFF SERVICE IN YOUR HOME?: YES
WITHIN THE LAST YEAR, HAVE TO BEEN RAPED OR FORCED TO HAVE ANY KIND OF SEXUAL ACTIVITY BY YOUR PARTNER OR EX-PARTNER?: NO

## 2024-06-11 ASSESSMENT — FIBROSIS 4 INDEX
FIB4 SCORE: 0.65
FIB4 SCORE: 0.65

## 2024-06-11 ASSESSMENT — PAIN DESCRIPTION - PAIN TYPE: TYPE: ACUTE PAIN;SURGICAL PAIN

## 2024-06-11 NOTE — ED NOTES
Pt here for wound check on left thumb. Pt was seen here on 6/5 for the same and was given ABX in which he has been noncompliant. Pt signed out AMA on 6/5 as well. Pt had unknown amount of vodka overnight. NAD noted. Awaiting ERP to see.

## 2024-06-11 NOTE — CONSULTS
"6/11/2024    Time Called: 1400  Time Arrived: 1630      HPI: Sivakumar Levi is a 28 y.o. male who presents with left thumb wound for several weeks to months.  He is recently admitted but left AMA stating he could heal it on its own.  He has not been taking his oral antibiotics that he was prescribed.    History reviewed. No pertinent past medical history.    Past Surgical History:   Procedure Laterality Date    OTHER      small tumor removed from throat as a baby       Medications  No current facility-administered medications on file prior to encounter.     Current Outpatient Medications on File Prior to Encounter   Medication Sig Dispense Refill    sulfamethoxazole-trimethoprim (BACTRIM DS) 800-160 MG tablet Take 1 Tablet by mouth 2 times a day for 7 days. 14 Tablet 0       Allergies  Patient has no known allergies.    ROS  . All other systems were reviewed and found to be negative    History reviewed. No pertinent family history.    Social History     Socioeconomic History    Marital status: Single   Tobacco Use    Smoking status: Every Day     Types: Cigarettes    Smokeless tobacco: Never   Vaping Use    Vaping status: Some Days   Substance and Sexual Activity    Alcohol use: Yes     Comment: None since 12/17/23; previous hx of ETOH abuse    Drug use: Not Currently       Physical Exam  Vitals  /71   Pulse 78   Temp 35.9 °C (96.7 °F) (Temporal)   Resp 15   Ht 1.727 m (5' 8\")   Wt 53.8 kg (118 lb 9.7 oz)   SpO2 96%   General: Well Developed, Well Nourished, Age appropriate appearance  HEENT: Normocephalic, atraumatic  Psych: Normal mood and affect  Neck: Supple, nontender, no masses  Lungs: Breathing unlabored, No audible wheezing  Heart: Regular heart rate and rhythm  Abdomen: Soft, NT, ND  Neuro: Sensation grossly intact to BUE and BLE, moving all four extremities  Skin: Draining wound left thumb  Vascular: , Capillary refill <2 seconds  MSK: Left thumb: Large ulceration with draining wound over " the ulnar aspect of the mid thumb.  Surrounding erythema noted.  Sensation tact distally.  Motion intact flexion extension of the IP and MCP joints.      Radiographs:  DX-HAND 3+ LEFT   Final Result      Soft tissue swelling without acute osseous abnormality.          Laboratory Values  Recent Labs     06/11/24  0731   WBC 8.6   RBC 5.40   HEMOGLOBIN 16.5   HEMATOCRIT 46.8   MCV 86.7   MCH 30.6   MCHC 35.3   RDW 41.5   PLATELETCT 331   MPV 10.2     Recent Labs     06/11/24  0731   SODIUM 141   POTASSIUM 3.8   CHLORIDE 103   CO2 19*   GLUCOSE 90   BUN 13             Impression: 28-year-old male history of alcohol abuse with a month-long history of left thumb abscess draining wound.  Plan for I&D today.  We discussed this is significant infection may require multiple operations.  Will acquire additional IV antibiotics and likely discharge on oral antibiotics.  Likely require significant wound care as well.    Plan:We discussed the diagnosis and findings with the patient at length.  We reviewed possible non operative and operative interventions and the risks and benefits of each of these.  he had a chance to ask questions and all of these were answered to his satisfaction. The patient chose to proceed with surgical intervention. Risks and benefits of surgery were discussed which include but are not limited to bleeding, infection, neurovascular damage, malunion, nonunion, instability, limb length discrepancy, DVT, PE, MI, Stroke and death. They understand these risks and wish to proceed.      Oscar Bullock MD  Orthopedic Trauma Surgery

## 2024-06-11 NOTE — ED TRIAGE NOTES
"Chief Complaint   Patient presents with    Wound Check     Pt has wound to left thumb. Reports he has MRSA. Was seen 6/5/2 for same complaint. He signed out AMA & was prescribed bactrim. Has been non-complaint with medications.     Alcohol Intoxication     Pt was drinking vodka last night unknown amount.      BIB EMS. EMS was called by PD. Pt was found yelling and acting aggressive. VS: 140/90, , Resp 18, SPO2 96% RA, GCS 15, BG 95.     Pt states he wants to have his thumb cut off because he has dishes to wash. Pt A & O x 4.     Pt ambulates from [facility] with steady gait. Skin signs pink, warm, dry. Pt speaking full sentences, A & O x 4. Respirations equal and unlabored. Pt educated on triage process and to alert staff of any changing conditions. Pt returned to the [facility].     BP (!) 130/91   Pulse (!) 112   Temp 36.8 °C (98.2 °F) (Temporal)   Resp 16   Ht 1.727 m (5' 8\")   Wt 53.8 kg (118 lb 9.7 oz)   SpO2 98%   BMI 18.03 kg/m²       "

## 2024-06-11 NOTE — ED NOTES
"Med rec is complete per Patient at bedside.  Per Pt, he \"only took 2\" tablets of his antibiotic then \"stopped because he was drinking\".  Allergies reviewed.    Has patient had any outside antibiotics in the last 30 days? Y    Any Anticoagulants (rivaroxaban, apixaban, edoxaban, dabigatran, warfarin, enoxaparin) taken in the last 14 days? N         Pharmacy/Pharmacies Pt utilizes : [facility]         "

## 2024-06-11 NOTE — ANESTHESIA PREPROCEDURE EVALUATION
Case: [id number] Date/Time: 06/11/24 1633    Procedure: IRRIGATION AND DEBRIDEMENT, WOUND (Left: Thumb)    Location: [facility] OR 16 / SURGERY [facility]    Surgeons: Oscar Bullock M.D.          29yo M w/ EtOH use, presented today w/ left thumb infection. NPO. Lactate down to 1.8 from 3.7. .  Relevant Problems   No relevant active problems       Physical Exam    Airway   Mallampati: II  TM distance: >3 FB  Neck ROM: full       Cardiovascular - normal exam  Rhythm: regular  Rate: normal  (-) murmur     Dental - normal exam           Pulmonary - normal exam  Breath sounds clear to auscultation     Abdominal    Neurological - normal exam                   Anesthesia Plan    ASA 3   ASA physical status 3 criteria: alcohol and/or substance dependence or abuse    Plan - general       Airway plan will be LMA          Induction: intravenous    Postoperative Plan: Postoperative administration of opioids is intended.    Pertinent diagnostic labs and testing reviewed    Informed Consent:    Anesthetic plan and risks discussed with patient.    Use of blood products discussed with: patient whom consented to blood products.

## 2024-06-11 NOTE — PROGRESS NOTES
Pharmacy Vancomycin Kinetics Note for 6/11/2024     28 y.o. male on Vancomycin day # 1     Vancomycin Indication (AUC Dosing): Skin/skin structure infection      Provider specified end date: 06/16/24    Active Antibiotics (From admission, onward)      Ordered     Ordering Provider       Tue Jun 11, 2024  9:42 AM    06/11/24 0942  MD Alert...Vancomycin per Pharmacy  PRN        Question:  Indication(s) for vancomycin?  Answer:  Skin and soft tissue infection    Cade Romero M.D.       Tue Jun 11, 2024  7:20 AM    06/11/24 0720  vancomycin (Vancocin) 1,250 mg in  mL IVPB  (vancomycin (VANCOCIN) IV (LD + Maintenance))  ONCE         Kat Min M.D.            Dosing Weight: 53 kg (116 lb 13.5 oz)      Admission History: Admitted on 6/11/2024 for Abscess of left thumb [L02.512]  Pertinent history: 28 year old started on vancomycin    Allergies:     Patient has no known allergies.     Pertinent cultures to date:     Results       Procedure Component Value Units Date/Time    CULTURE WOUND W/ GRAM STAIN [[id number]]     Order Status: Sent Specimen: Wound from Left Hand     Blood Culture - Draw one from central line and one from peripheral site [[id number]] Collected: 06/11/24 0731    Order Status: Sent Specimen: Blood from Line Updated: 06/11/24 0926    Blood Culture - Draw one from central line and one from peripheral site [[id number]] Collected: 06/11/24 0745    Order Status: Sent Specimen: Blood from Peripheral Updated: 06/11/24 0924    MRSA By PCR (Amp) [[id number]] Collected: 06/11/24 0731    Order Status: Sent Specimen: Blood from Nares Updated: 06/11/24 0832    Urine Culture (New) [[id number]]     Order Status: Sent Specimen: Urine             Labs:     Estimated Creatinine Clearance: 122.4 mL/min (by C-G formula based on SCr of 0.69 mg/dL).  Recent Labs     06/11/24 0731   WBC 8.6   NEUTSPOLYS 48.60     Recent Labs     06/11/24 0731   BUN 13   CREATININE 0.69   ALBUMIN 4.2       Intake/Output Summary  "(Last 24 hours) at 2024 1055  Last data filed at 2024 1000  Gross per 24 hour   Intake 1714 ml   Output --   Net 1714 ml      /81   Pulse 82   Temp 36.8 °C (98.2 °F) (Temporal)   Resp 17   Ht 1.727 m (5' 8\")   Wt 53.8 kg (118 lb 9.7 oz)   SpO2 96%  Temp (24hrs), Av.8 °C (98.2 °F), Min:36.8 °C (98.2 °F), Max:36.8 °C (98.2 °F)      List concerns for Vancomycin clearance:          Pharmacokinetics:     AUC kinetics:   Ke (hr ^-1): 0.104 hr^-1  Half life: 6.66 hr  Clearance: 3.583  Estimated TDD: 1791.5  Estimated Dose: 649  Estimated interval: 8.7        A/P:     -  Vancomycin dose: 1250 mg IV loading dose followed by 1000 mg IV every 12 hours    -  Next vancomycin level(s): defer    -  Predicted vancomycin AUC from initial AUC test calculator: 558 mg·hr/L      -  Comments: follow up on MRSA willian Conway, PharmD    "

## 2024-06-11 NOTE — ED PROVIDER NOTES
"ED Provider Note    Scribed for Kat Min M.D. by Tim Coulter. 6/11/2024, 7:08 AM.    Primary care provider: Pcp Pt States None  Means of arrival: EMS  History obtained from: Patient  History limited by: None    CHIEF COMPLAINT  Chief Complaint   Patient presents with    Wound Check     Pt has wound to left thumb. Reports he has MRSA. Was seen 6/5/2 for same complaint. He signed out AMA & was prescribed bactrim. Has been non-complaint with medications.     Alcohol Intoxication     Pt was drinking vodka last night unknown amount.        [facility] King Gurmeet is a 28 y.o. male who presents to the Emergency Department via EMS for worsening left thumb pain and alcohol intoxication. Patient states \"I have MRSA, and I am not able to wash dishes with my thumb anymore.\"  Patient states that he has had a left thumb wound for approximately a month and it is getting worse.  He was offered hospitalization on his last ER visit but decided to leave AMA believing it could heal on its own. He was prescribed Bactrim and states he has not been taking it as he did not want to mix alcohol and antibiotics.      EXTERNAL RECORDS REVIEWED  Seen here for left thumb pain and swelling on 6/5/24. Was recommended to be hospitalized but left AMA.     LIMITATION TO HISTORY   Select: : None    OUTSIDE HISTORIAN(S):  None      PAST MEDICAL HISTORY       SURGICAL HISTORY   has a past surgical history that includes other.    SOCIAL HISTORY  Social History     Tobacco Use    Smoking status: Every Day     Types: Cigarettes    Smokeless tobacco: Never   Vaping Use    Vaping status: Some Days   Substance Use Topics    Alcohol use: Yes     Comment: None since 12/17/23; previous hx of ETOH abuse    Drug use: Not Currently      Social History     Substance and Sexual Activity   Drug Use Not Currently       FAMILY HISTORY  History reviewed. No pertinent family history.    CURRENT MEDICATIONS  Home Medications       Reviewed by Megha LIMA" "Thomas Heaton (Pharmacy Tech) on 06/11/24 at 0949  Med List Status: Complete     Medication Last Dose Status   sulfamethoxazole-trimethoprim (BACTRIM DS) 800-160 MG tablet 6/6/2024 Active                  Audit from Redirected Encounters    **Home medications have not yet been reviewed for this encounter**         ALLERGIES  No Known Allergies    PHYSICAL EXAM  VITAL SIGNS: BP (!) 130/91   Pulse (!) 112   Temp 36.8 °C (98.2 °F) (Temporal)   Resp 16   Ht 1.727 m (5' 8\")   Wt 53.8 kg (118 lb 9.7 oz)   SpO2 98%   BMI 18.03 kg/m²   Vitals reviewed by myself.  Physical Exam  Nursing note and vitals reviewed.  Constitutional: Well-developed and well-nourished.  Mild distress  HENT: Head is normocephalic and atraumatic.  Eyes: extra-ocular movements intact  Cardiovascular: Tachycardic, regular rhythm. No murmur heard.  Pulmonary/Chest: Breath sounds normal. No wheezes or rales.   Musculoskeletal: Left thumb notable for open draining wound, with associated surrounding erythema and swelling.  Patient is still able to fully range his thumb at the MCP and interphalangeal joint.  See clinical image below. Extremities exhibit normal range of motion without edema or tenderness.   Neurological: Awake and alert, sensation intact all distal fingertips  Skin: Skin is warm and dry. No rash.       DIAGNOSTIC STUDIES:  LABS  Labs Reviewed   LACTIC ACID - Abnormal; Notable for the following components:       Result Value    Lactic Acid 3.7 (*)     All other components within normal limits   CBC WITH DIFFERENTIAL - Abnormal; Notable for the following components:    Lymphocytes 45.30 (*)     All other components within normal limits   COMP METABOLIC PANEL - Abnormal; Notable for the following components:    Co2 19 (*)     Anion Gap 19.0 (*)     Alkaline Phosphatase 108 (*)     All other components within normal limits   DIAGNOSTIC ALCOHOL - Abnormal; Notable for the following components:    Diagnostic Alcohol 302.2 (*)     All other " components within normal limits   ESTIMATED GFR   LACTIC ACID   LACTIC ACID   URINE CULTURE(NEW)   BLOOD CULTURE   BLOOD CULTURE   MRSA BY PCR (AMP)   CULTURE WOUND W/ GRAM STAIN   LACTIC ACID       All labs reviewed and independently interpreted by myself    RADIOLOGY  Images independently interpreted by myself :  -No acute bony abnormality, soft tissue swelling of the left thumb  Final interpretation by radiology demonstrates:    DX-HAND 3+ LEFT   Final Result      Soft tissue swelling without acute osseous abnormality.        The radiologist's interpretation of all radiological studies have been reviewed by me.      COURSE & MEDICAL DECISION MAKING      INITIAL ASSESSMENT, ED COURSE AND PLAN    Patient is a 28-year-old male who presents for evaluation of left thumb wound.  Differential diagnosis includes cellulitis, abscess, flexor tenosynovitis, alcohol intoxication.  Patient is tachycardic with obvious infection on exam, sepsis protocol initiated.  Patient empirically started on Unasyn and vancomycin and 30 cc/kg IV fluid bolus.    Patient initial vitals notable for tachycardia, he is receiving IV fluids.  Patient given Toradol for his pain in his left thumb.  Clinically this seems to be an abscess that is now opened up, may require further debridement.  Labs are notable for lactic acid of 3.7 for which she is receiving IV fluid resuscitation and IV antibiotics.  Patient is also noted to be intoxicated with alcohol level of 302.  Case discussed with orthopedics who will evaluate patient for possible surgical intervention, Dr. Bullock plans for possible debridement this afternoon.  This time patient will be hospitalized for ongoing management of left thumb cellulitis and possible persistent abscess.  Patient hospitalized in guarded condition.       REASSESSMENTS   7:08 AM - Patient seen and examined at bedside. Discussed plan of care, including ordering labs/imaging. Patient agrees to the plan of care.     9:20  AM - I discussed the patient's case and the above findings with Dr. Bullock (Ortho) who agrees to consult.      9:29 AM - I discussed the patient's case and the above findings with Dr. Daniel Romero (Hospitalist) who agrees to evaluate the patient for hospitalization.     HYDRATION: Based on the patient's presentation of Sepsis the patient was given IV fluids. IV Hydration was used because oral hydration was not adequate alone. Upon recheck following hydration, the patient was improved.        DISPOSITION AND DISCUSSIONS  I have discussed management of the patient with the following physicians and ELLIS's:  Dr. Bullock (Ortho), Dr. Daniel Romero (Hospitalist)     Discussion of management with other [facility] or appropriate source(s): none     Escalation of care considered, and ultimately not performed:see above    Barriers to care at this time, including but not limited to: none.     Decision tools and prescription drugs considered including, but not limited to: see above.    Please see review of records as noted above    DISPOSITION:  Patient will be hospitalized by Dr. Daniel Romero in guarded condition.    FINAL IMPRESSION  1. Cellulitis of left thumb    2. Alcoholic intoxication without complication (HCC)    3. Open wound of left thumb, initial encounter          ITim (Stoneyibfabián), am scribing for, and in the presence of, Kat Min M.D..    Electronically signed by: Tim Coulter (Ivana), 6/11/2024    Kat ARENAS M.D. personally performed the services described in this documentation, as scribed by Tim Coulter in my presence, and it is both accurate and complete.    The note accurately reflects work and decisions made by me.  Kat Min M.D.  6/11/2024  12:16 PM

## 2024-06-11 NOTE — ED NOTES
Bedside report received from off going RN: Savana. Assumed care of patient.  POC discussed with patient. Call light within reach, all needs addressed at this time.       Fall risk interventions in place: Not Applicable   Continuous monitoring: Cardiac Leads, Pulse Ox, or Blood Pressure  IVF/IV medications: Not Applicable   Oxygen: Room Air  Bedside sitter: Not Applicable   Isolation: Not Applicable

## 2024-06-12 VITALS
SYSTOLIC BLOOD PRESSURE: 102 MMHG | HEIGHT: 68 IN | OXYGEN SATURATION: 95 % | HEART RATE: 80 BPM | BODY MASS INDEX: 16.47 KG/M2 | DIASTOLIC BLOOD PRESSURE: 65 MMHG | WEIGHT: 108.69 LBS | TEMPERATURE: 99 F | RESPIRATION RATE: 16 BRPM

## 2024-06-12 LAB
ALBUMIN SERPL BCP-MCNC: 3.8 G/DL (ref 3.2–4.9)
ALBUMIN/GLOB SERPL: 1.4 G/DL
ALP SERPL-CCNC: 94 U/L (ref 30–99)
ALT SERPL-CCNC: 21 U/L (ref 2–50)
ANION GAP SERPL CALC-SCNC: 11 MMOL/L (ref 7–16)
AST SERPL-CCNC: 31 U/L (ref 12–45)
BASOPHILS # BLD AUTO: 0.2 % (ref 0–1.8)
BASOPHILS # BLD: 0.01 K/UL (ref 0–0.12)
BILIRUB SERPL-MCNC: 1.3 MG/DL (ref 0.1–1.5)
BUN SERPL-MCNC: 14 MG/DL (ref 8–22)
CALCIUM ALBUM COR SERPL-MCNC: 9.2 MG/DL (ref 8.5–10.5)
CALCIUM SERPL-MCNC: 9 MG/DL (ref 8.5–10.5)
CHLORIDE SERPL-SCNC: 104 MMOL/L (ref 96–112)
CO2 SERPL-SCNC: 24 MMOL/L (ref 20–33)
CREAT SERPL-MCNC: 0.63 MG/DL (ref 0.5–1.4)
EOSINOPHIL # BLD AUTO: 0 K/UL (ref 0–0.51)
EOSINOPHIL NFR BLD: 0 % (ref 0–6.9)
ERYTHROCYTE [DISTWIDTH] IN BLOOD BY AUTOMATED COUNT: 42.6 FL (ref 35.9–50)
GFR SERPLBLD CREATININE-BSD FMLA CKD-EPI: 133 ML/MIN/1.73 M 2
GLOBULIN SER CALC-MCNC: 2.8 G/DL (ref 1.9–3.5)
GLUCOSE SERPL-MCNC: 145 MG/DL (ref 65–99)
HCT VFR BLD AUTO: 43.5 % (ref 42–52)
HGB BLD-MCNC: 14.8 G/DL (ref 14–18)
IMM GRANULOCYTES # BLD AUTO: 0.02 K/UL (ref 0–0.11)
IMM GRANULOCYTES NFR BLD AUTO: 0.4 % (ref 0–0.9)
LYMPHOCYTES # BLD AUTO: 0.46 K/UL (ref 1–4.8)
LYMPHOCYTES NFR BLD: 8.5 % (ref 22–41)
MAGNESIUM SERPL-MCNC: 1.7 MG/DL (ref 1.5–2.5)
MCH RBC QN AUTO: 30.2 PG (ref 27–33)
MCHC RBC AUTO-ENTMCNC: 34 G/DL (ref 32.3–36.5)
MCV RBC AUTO: 88.8 FL (ref 81.4–97.8)
MONOCYTES # BLD AUTO: 0.1 K/UL (ref 0–0.85)
MONOCYTES NFR BLD AUTO: 1.8 % (ref 0–13.4)
NEUTROPHILS # BLD AUTO: 4.83 K/UL (ref 1.82–7.42)
NEUTROPHILS NFR BLD: 89.1 % (ref 44–72)
NRBC # BLD AUTO: 0 K/UL
NRBC BLD-RTO: 0 /100 WBC (ref 0–0.2)
PHOSPHATE SERPL-MCNC: 2.7 MG/DL (ref 2.5–4.5)
PLATELET # BLD AUTO: 264 K/UL (ref 164–446)
PMV BLD AUTO: 10.4 FL (ref 9–12.9)
POTASSIUM SERPL-SCNC: 4.5 MMOL/L (ref 3.6–5.5)
PROT SERPL-MCNC: 6.6 G/DL (ref 6–8.2)
RBC # BLD AUTO: 4.9 M/UL (ref 4.7–6.1)
SODIUM SERPL-SCNC: 139 MMOL/L (ref 135–145)
WBC # BLD AUTO: 5.4 K/UL (ref 4.8–10.8)

## 2024-06-12 PROCEDURE — 80053 COMPREHEN METABOLIC PANEL: CPT

## 2024-06-12 PROCEDURE — 83735 ASSAY OF MAGNESIUM: CPT

## 2024-06-12 PROCEDURE — 36415 COLL VENOUS BLD VENIPUNCTURE: CPT

## 2024-06-12 PROCEDURE — 84100 ASSAY OF PHOSPHORUS: CPT

## 2024-06-12 PROCEDURE — 700102 HCHG RX REV CODE 250 W/ 637 OVERRIDE(OP): Performed by: HOSPITALIST

## 2024-06-12 PROCEDURE — A9270 NON-COVERED ITEM OR SERVICE: HCPCS | Performed by: HOSPITALIST

## 2024-06-12 PROCEDURE — 85025 COMPLETE CBC W/AUTO DIFF WBC: CPT

## 2024-06-12 RX ADMIN — LORAZEPAM 1 MG: 1 TABLET ORAL at 02:03

## 2024-06-12 RX ADMIN — ACETAMINOPHEN 650 MG: 325 TABLET, FILM COATED ORAL at 02:03

## 2024-06-12 RX ADMIN — NICOTINE POLACRILEX 2 MG: 2 GUM, CHEWING BUCCAL at 01:36

## 2024-06-12 ASSESSMENT — LIFESTYLE VARIABLES
AGITATION: SOMEWHAT MORE THAN NORMAL ACTIVITY
HEADACHE, FULLNESS IN HEAD: MILD
ORIENTATION AND CLOUDING OF SENSORIUM: ORIENTED AND CAN DO SERIAL ADDITIONS
VISUAL DISTURBANCES: NOT PRESENT
PAROXYSMAL SWEATS: BARELY PERCEPTIBLE SWEATING. PALMS MOIST
ANXIETY: *
NAUSEA AND VOMITING: MILD NAUSEA WITH NO VOMITING
AUDITORY DISTURBANCES: NOT PRESENT
TOTAL SCORE: 9
TREMOR: *

## 2024-06-12 NOTE — PROGRESS NOTES
4 Eyes Skin Assessment Completed by MIRELLA Lopez and MIRELLA Ryder.    Head WDL  Ears WDL  Nose WDL  Mouth WDL  Neck WDL  Breast/Chest WDL  Shoulder Blades Redness and Blanching  Spine Redness and Blanching  (R) Arm/Elbow/Hand Redness and Blanching  (L) Arm/Elbow/Hand Redness and Blanching  Abdomen WDL  Groin WDL L hip: rashes, wound  Scrotum/Coccyx/Buttocks Redness and Blanching, sacral optifoam applied  (R) Leg WDL  (L) Leg WDL  (R) Heel/Foot/Toe WDL  (L) Heel/Foot/Toe WDL          Devices In Places Blood Pressure Cuff, pulse ox      Interventions In Place Sacral Mepilex, Pillows, and Barrier Cream    Possible Skin Injury Yes    Pictures Uploaded Into Epic Yes  Wound Consult Placed Yes  RN Wound Prevention Protocol Ordered Yes

## 2024-06-12 NOTE — PROGRESS NOTES
Pt's PIV removed. Pt walked out on the unit in stable condition. Educated pt that he needs to take his antibiotics for his left  thumb wound s/p I and D. Pt said he needs to be in his apartment ( last day today) so that he can get his clothes ( 3 bags) before he goes to care campus this morning.

## 2024-06-12 NOTE — DISCHARGE SUMMARY
Discharge Summary    CHIEF COMPLAINT ON ADMISSION  Chief Complaint   Patient presents with    Wound Check     Pt has wound to left thumb. Reports he has MRSA. Was seen 6/5/2 for same complaint. He signed out AMA & was prescribed bactrim. Has been non-complaint with medications.     Alcohol Intoxication     Pt was drinking vodka last night unknown amount.        Reason for Admission  Finger lac     Admission Date  6/11/2024    CODE STATUS  Prior    HPI & HOSPITAL COURSE  diaz Levi is a 28 y.o. male who presented 6/11/2024 with left thumb wound for 1 month he was seen in the emergency department recommended admission however he declined and was prescribed Bactrim.  He took the Bactrim for only 2 doses.  He presents back to the emergency department for persistent drainage and pain from his left thumb no fever no chills.  Patient drinks hard liquor daily he reports he drinks about 36 ounces a day.  He denies any alcohol drawl seizures.   He was admitted started on librium evaluated by orthopedics and underwent I&D. Patient decided to leave [place] after surgery.  I tried to call the patient twice this morning to check on him and advise him to come back for wound recheck but he did not answer, I left him a message to [facility] for wound recheck.

## 2024-06-12 NOTE — ANESTHESIA POSTPROCEDURE EVALUATION
Patient: Sivakumar Levi    Procedure Summary       Date: 06/11/24 Room / Location: [place] [address] / SURGERY [facility]    Anesthesia Start: 1645 Anesthesia Stop: 1716    Procedure: IRRIGATION AND DEBRIDEMENT, LEFT THUMB ABSCESS (Left: Thumb) Diagnosis: (LEFT THUMB ABSCESS)    Surgeons: Oscar Bullock M.D. Responsible Provider: Darian Colón M.D.    Anesthesia Type: general ASA Status: 3            Final Anesthesia Type: general  Last vitals  BP   Blood Pressure: 128/64    Temp   36.7 °C (98.1 °F)    Pulse   89   Resp   18    SpO2   93 %      Anesthesia Post Evaluation    Patient location during evaluation: PACU  Patient participation: complete - patient participated  Level of consciousness: awake and alert    Airway patency: patent  Anesthetic complications: no  Cardiovascular status: hemodynamically stable  Respiratory status: acceptable  Hydration status: euvolemic    PONV: none          No notable events documented.     Nurse Pain Score: 1 (NPRS)

## 2024-06-12 NOTE — ANESTHESIA TIME REPORT
Anesthesia Start and Stop Event Times       Date Time Event    6/11/2024 1644 Ready for Procedure     1645 Anesthesia Start     1716 Anesthesia Stop          Responsible Staff  06/11/24      Name Role Begin End    Min SURAJ Colón M.D. Anesth 1643 1718          Overtime Reason:  no overtime (within assigned shift)    Comments:

## 2024-06-12 NOTE — PROGRESS NOTES
Pt is verbalizing wants to go AMA. Education provided on importance of IV Abx. Notified the provider LETY Neil

## 2024-06-12 NOTE — OP REPORT
DATE OF OPERATION: 6/12/2024     PREOPERATIVE DIAGNOSIS: Left thumb abscess    POSTOPERATIVE DIAGNOSIS: Same    PROCEDURE PERFORMED: Left thumb I&D of abscess    SURGEON: Oscar Bullock M.D.     ASSISTANT: None    ANESTHESIA: General    SPECIMEN: None    ESTIMATED BLOOD LOSS: 5 mL    IMPLANTS: None      INDICATIONS: The patient is a 28 y.o. male who presented with left thumb abscess.  I discussed the risks and benefits of the procedure which include but are not limited to risks of infection, wound healing complication, neurovascular injury, pain, malunion, non-union, malrotation, and the medical risks of anesthesia including MI, stroke, and death.  Alternatives to surgery were also discussed, including non-operative management, which I did not recommend.  The patient was in agreement with the plan to proceed, and the informed consent was signed and documented.  I met with the patient pre-operatively and marked the operative extremity with their agreement.  We proceeded to the operating room.     DESCRIPTION OF PROCEDURE:  Patient was seen in the preoperative holding area on the day of surgery. The operative site was marked with my initials.  he was taken to the operating room and placed supine on the operative table.  Anesthesia was induced.  The operative extremity was prepped and draped in the normal sterile fashion.  Operative pause was conducted and the correct patient, site, side, procedure, and surgeon's initials on extremity were identified.  There is a 1 cm draining abscess over the ulnar aspect of his thumb.  This was incised small amount of purulent fluid was released and sent for culture.  Remaining wound was thoroughly debrided using a curette.  This did not appear to track down to the flexor tendon sheath.  The wound was thoroughly irrigated with sterile saline.  The remaining tissue appeared to be healthy.  There is a moderate amount of missing skin.  Soft tissue was released allowing for some  excursion.  This was loosely closed with 3-0 nylon suture.  Sterile dressings applied he is awoken taken to PACU stable condition.    POSTOPERATIVE PLAN: Nonweightbearing left hand.  Antibiotics per ID.  The patient will follow up in clinic in 2 weeks to check wounds and remove sutures/staples.      ____________________________________   Oscar Bullock M.D.   DD: 6/12/2024  1:52 PM

## 2024-06-12 NOTE — H&P
Hospital Medicine History & Physical Note    Date of Service  6/11/2024    Primary Care Physician  Pcp Pt States None    Consultants  orthopedics    Specialist Names: Dr Bullock    Code Status  Full Code    Chief Complaint  Chief Complaint   Patient presents with    Wound Check     Pt has wound to left thumb. Reports he has MRSA. Was seen 6/5/2 for same complaint. He signed out AMA & was prescribed bactrim. Has been non-complaint with medications.     Alcohol Intoxication     Pt was drinking vodka last night unknown amount.        History of Presenting Illness  Sivakumar Levi is a 28 y.o. male who presented 6/11/2024 with left thumb wound for 1 month he was seen in the emergency department recommended admission however he declined and was prescribed Bactrim.  He took the Bactrim for only 2 doses.  He presents back to the emergency department for persistent drainage and pain from his left thumb no fever no chills.  Patient drinks hard liquor daily he reports he drinks about 36 ounces a day.  He denies any alcohol drawl seizures.    I discussed the plan of care with patient and ED physician .    Review of Systems  Review of Systems   All other systems reviewed and are negative.      Past Medical History  Negative per patient    Surgical History  Negative per patient.    Family History    Family history reviewed with patient. There is no family history that is pertinent to the chief complaint.     Social History   reports that he has been smoking cigarettes. He has never used smokeless tobacco. He reports current alcohol use. He reports that he does not currently use drugs.    Allergies  No Known Allergies    Medications  Prior to Admission Medications   Prescriptions Last Dose Informant Patient Reported? Taking?   sulfamethoxazole-trimethoprim (BACTRIM DS) 800-160 MG tablet 6/6/2024 at stopped  No Yes   Sig: Take 1 Tablet by mouth 2 times a day for 7 days.      Facility-Administered Medications: None       Physical  "Exam  Temp:  [35.9 °C (96.7 °F)-36.8 °C (98.2 °F)] 36.4 °C (97.5 °F)  Pulse:  [] 75  Resp:  [15-20] 16  BP: (100-136)/() 103/58  SpO2:  [90 %-98 %] 98 %  Blood Pressure: 103/58   Temperature: 36.4 °C (97.5 °F)   Pulse: 75   Respiration: 16   Pulse Oximetry: 98 %       Physical Exam  Vitals and nursing note reviewed.   HENT:      Head: Normocephalic.   Eyes:      General:         Right eye: No discharge.         Left eye: No discharge.   Cardiovascular:      Rate and Rhythm: Normal rate and regular rhythm.      Heart sounds: No murmur heard.  Abdominal:      General: Bowel sounds are normal.      Palpations: Abdomen is soft.      Tenderness: There is no abdominal tenderness.   Musculoskeletal:         General: No swelling.   Skin:     Comments: Left thumb dressed with purulent drainage noted   Neurological:      General: No focal deficit present.      Mental Status: He is alert.      Cranial Nerves: No cranial nerve deficit.      Motor: No weakness.      Comments: Tremors noted more   Psychiatric:         Mood and Affect: Mood normal.         Behavior: Behavior normal.         Laboratory:  Recent Labs     06/11/24  0731   WBC 8.6   RBC 5.40   HEMOGLOBIN 16.5   HEMATOCRIT 46.8   MCV 86.7   MCH 30.6   MCHC 35.3   RDW 41.5   PLATELETCT 331   MPV 10.2     Recent Labs     06/11/24  0731   SODIUM 141   POTASSIUM 3.8   CHLORIDE 103   CO2 19*   GLUCOSE 90   BUN 13   CREATININE 0.69   CALCIUM 9.5     Recent Labs     06/11/24  0731   ALTSGPT 17   ASTSGOT 33   ALKPHOSPHAT 108*   TBILIRUBIN 0.3   GLUCOSE 90         No results for input(s): \"NTPROBNP\" in the last 72 hours.      No results for input(s): \"TROPONINT\" in the last 72 hours.    Imaging:  DX-HAND 3+ LEFT   Final Result      Soft tissue swelling without acute osseous abnormality.               Assessment/Plan:  Justification for Admission Status  I anticipate this patient will require at least two midnights for appropriate medical management, necessitating " inpatient admission because left thumb abscess that will require surgical drainage and patient will likely progress to alcohol withdrawal    Patient will need a Med/Surg bed on MEDICAL service .  The need is secondary to left thumb abscess.    * Abscess of left thumb- (present on admission)  Assessment & Plan  Orthopedics consulted for evaluation for I&D  I reviewed x-ray negative for acute pathology  Concern for MRSA infection patient will be started on IV vancomycin and will follow-up on cultures    Lactic acidosis  Assessment & Plan  Likely related to alcoholism  IV fluids for hydration and repeat lactic acid ordered    Alcohol dependence (HCC)  Assessment & Plan  With early withdrawal symptoms  I ordered tapering dose of Librium  I ordered thiamine and folic acid  I ordered lorazepam per CIWA protocol        VTE prophylaxis: SCDs/TEDs l

## 2024-06-13 ENCOUNTER — PHARMACY VISIT (OUTPATIENT)
Dept: PHARMACY | Facility: MEDICAL CENTER | Age: 29
End: 2024-06-13
Payer: COMMERCIAL

## 2024-06-13 ENCOUNTER — HOSPITAL ENCOUNTER (EMERGENCY)
Facility: MEDICAL CENTER | Age: 29
End: 2024-06-13
Attending: EMERGENCY MEDICINE
Payer: MEDICAID

## 2024-06-13 VITALS
TEMPERATURE: 97.1 F | HEIGHT: 67 IN | DIASTOLIC BLOOD PRESSURE: 89 MMHG | HEART RATE: 82 BPM | OXYGEN SATURATION: 98 % | RESPIRATION RATE: 16 BRPM | BODY MASS INDEX: 19.27 KG/M2 | WEIGHT: 122.8 LBS | SYSTOLIC BLOOD PRESSURE: 143 MMHG

## 2024-06-13 DIAGNOSIS — B95.62 INFECTION OF SKIN DUE TO METHICILLIN RESISTANT STAPHYLOCOCCUS AUREUS (MRSA): ICD-10-CM

## 2024-06-13 DIAGNOSIS — L08.9 INFECTION OF SKIN DUE TO METHICILLIN RESISTANT STAPHYLOCOCCUS AUREUS (MRSA): ICD-10-CM

## 2024-06-13 DIAGNOSIS — L02.512 ABSCESS OF LEFT THUMB: ICD-10-CM

## 2024-06-13 LAB
BACTERIA TISS AEROBE CULT: ABNORMAL
BACTERIA TISS AEROBE CULT: ABNORMAL
BACTERIA UR CULT: NORMAL
GRAM STN SPEC: ABNORMAL
SIGNIFICANT IND 70042: ABNORMAL
SIGNIFICANT IND 70042: NORMAL
SITE SITE: ABNORMAL
SITE SITE: NORMAL
SOURCE SOURCE: ABNORMAL
SOURCE SOURCE: NORMAL

## 2024-06-13 PROCEDURE — RXMED WILLOW AMBULATORY MEDICATION CHARGE: Performed by: EMERGENCY MEDICINE

## 2024-06-13 PROCEDURE — A9270 NON-COVERED ITEM OR SERVICE: HCPCS | Mod: UD | Performed by: EMERGENCY MEDICINE

## 2024-06-13 PROCEDURE — 99283 EMERGENCY DEPT VISIT LOW MDM: CPT

## 2024-06-13 PROCEDURE — 700102 HCHG RX REV CODE 250 W/ 637 OVERRIDE(OP): Mod: UD | Performed by: EMERGENCY MEDICINE

## 2024-06-13 RX ORDER — LINEZOLID 600 MG/1
600 TABLET, FILM COATED ORAL 2 TIMES DAILY
Qty: 20 TABLET | Refills: 0 | Status: ACTIVE | OUTPATIENT
Start: 2024-06-13 | End: 2024-06-23

## 2024-06-13 RX ORDER — LINEZOLID 600 MG/1
600 TABLET, FILM COATED ORAL ONCE
Status: COMPLETED | OUTPATIENT
Start: 2024-06-13 | End: 2024-06-13

## 2024-06-13 RX ADMIN — LINEZOLID 600 MG: 600 TABLET, FILM COATED ORAL at 08:14

## 2024-06-13 ASSESSMENT — FIBROSIS 4 INDEX: FIB4 SCORE: 0.69

## 2024-06-13 NOTE — ED TRIAGE NOTES
"Pt ambulated into triage sts he is here to \"finish getting this fixed so I can go back to work\" referring to Left hand wound. Pt also sts \"I jovany this paper signed\" pt is A&O x's 4 and ambulatory  "

## 2024-06-13 NOTE — ED NOTES
This RN talked to SW per ERP request. ERP advising admit for iv abx however pt does not want to lose bed at [facility].

## 2024-06-13 NOTE — ED NOTES
Pt ambulatory to [facility] with steady gait. Poor historian about mechanism of injury however states needs 'this all fixed in the next hour  and a work note for Monday so I can keep my job'   Pt reports getting stitches to thumb however leaving prior to abx.   Pt's home bandage removed from hand for wound evaluation by ERP. Chart up for Erp review.

## 2024-06-13 NOTE — ED PROVIDER NOTES
Emergency Physician Note    Chief Concern:  Chief Complaint   Patient presents with    Wound Check     Left hand wound         External Records Reviewed:  1.  Inpatient medical records reviewed: Hospital medicine physician discharge summary was reviewed from yesterday, 6/12/2024.  Patient initially presented to the emergency department 6/11/2024 with a left thumb wound, reportedly present for 1 month.  Admission was recommended, he was prescribed Bactrim.  He took the Bactrim for 2 doses, and declined admission at that time.  He returned to the emergency department for persistent drainage from the left thumb.  Does report a history of heavy alcohol use.  He was started on Librium, evaluated by orthopedics and underwent incision and drainage.  He elected to leave AGAINST MEDICAL ADVICE after surgery.  Hospital medicine physician left a message for the patient to return for wound recheck.     2.  Inpatient medical records reviewed: Orthopedic surgeon consultation note reviewed from 6/11/2024.  Examination of the left thumb was significant for draining wound.  He was taken to the operating room for incision and drainage.  Recommendation was for antibiotics per infectious disease with follow-up appointment in 2 weeks for recheck.      HPI/ROS     HPI:  Sivakumar Leiv is a 28 y.o. male who presents to the emergency department today for evaluation of a left thumb wound, he is postop day 2 status post operative I&D of a right thumb abscess.  He was initially hospitalized with plan to follow-up cultures, and continue IV antibiotics, however shortly after the procedure he elected to leave AGAINST MEDICAL ADVICE.  He had been contacted by our hospitalist physician and encouraged to return for wound recheck, so he came back to the emergency department today.  He states the wound looks good, he has not noticed any abnormal drainage.  He is not currently on any antibiotics, as he left before he was able to receive an  "outpatient prescription when he left the hospital AGAINST MEDICAL ADVICE.  He is very concerned about losing his bed in the [facility], and is also concerned about returning to work.  He reports no fevers, no worsening pain, does not currently have any scheduled follow-up with primary care, nor orthopedics.    PAST MEDICAL HISTORY  History reviewed. No pertinent past medical history.    SURGICAL HISTORY  Past Surgical History:   Procedure Laterality Date    IRRIGATION & DEBRIDEMENT GENERAL Left 6/11/2024    Procedure: IRRIGATION AND DEBRIDEMENT, LEFT THUMB ABSCESS;  Surgeon: Oscar Bullock M.D.;  Location: SURGERY [facility];  Service: Orthopedics    OTHER      small tumor removed from throat as a baby       FAMILY HISTORY  History reviewed. No pertinent family history.    SOCIAL HISTORY   reports that he has been smoking cigarettes. He has never used smokeless tobacco. He reports current alcohol use. He reports that he does not currently use drugs.    CURRENT MEDICATIONS  Discharge Medication List as of 6/13/2024  8:39 AM          ALLERGIES  Patient has no known allergies.    PHYSICAL EXAM  Vital Signs: BP (!) 143/89   Pulse 82   Temp 36.2 °C (97.1 °F) (Temporal)   Resp 16   Ht 1.702 m (5' 7\")   Wt 55.7 kg (122 lb 12.7 oz)   SpO2 98%   BMI 19.23 kg/m²   Constitutional: Alert, no acute distress  HENT: Atraumatic  Neck: Normal range of motion  Cardiovascular: Left distal thumb warm, well perfused, normal capillary refill time  Pulmonary: Normal work of breathing  Skin: Incision site is normal-appearing on the left thumb, no purulent drainage or discharge, no surrounding cellulitis, sutures are in place and intact, no significant swelling of the digit, no evidence of flexor tenosynovitis  Musculoskeletal: Abnormalities as documented in skin exam  Neurologic: Left thumb with normal sensory function along radial and ulnar aspect of the digit, motor function intact    Diagnostic Studies & " Procedures    Course and Medical Decision Making    Initial Assessment and Plan:    Mr. Levi presents to the emergency department today with wound recheck.  He has no concerns about the wound, states it is healing well.  I did review the initial plan for admission and IV antibiotics until wound cultures have resulted, he adamantly declines admission at this time. I reviewed the prior labs, MRSA PCR resulted positive on 6/11/2024.     His admitting hospitalist is on-call today, I was able to contact him and discuss the best plan as the patient is declining inpatient admission again.  Recommendation is for discharge on Zyvox, as we do not yet have complete cultures.  Hospitalist requests call back if the patient changes his mind and is amenable to admission, and he will  admit the patient at that time.    His orthopedic surgeon is Dr. Bullock, I placed a referral to orthopedics as well as provided follow-up information for Dr. Bullock for wound recheck and suture removal as recommended in his operative report.  I have also provided follow-up information for primary care, who may assist the patient with outpatient wound care if necessary.  Prescription for Zyvox was sent to the pharmacy, first dose was given in the emergency department.    Patient expressed concern that he required a work note.  He is asking me to sign a paper that list his duties as a , this does not appear to be a medical form.  Additionally, with a likely open MRSA sore on the hand, I do not believe the patient should be working as a  in  at this time.    As the patient was concerned about losing his housing at the [facility], I spoke with case management.   states there is no way to verify the patient will have a shelter bed available at time of discharge.  She was able to obtain the preauthorization necessary for the patient to receive his Zyvox.    He understands he is welcome and encouraged to  return to the emergency department at any time.  Risks and benefits of declining admission were discussed and understood by the patient.  He does appear to have full decision-making capacity.  Outpatient medications and follow-up plan were provided.  Return precautions were discussed with the patient, and provided in written form with the patient's discharge instructions.     Additional Problems and Disposition    I have discussed management of the patient with the following physicians:   Dr. Daniel Bowser, Hosptialist    Discussion of management with other Qualified Healthcare Professionals or appropriate source(s):   Yen ALTMAN RN Case Manager    Escalation of care considered, and ultimately not performed:   Hospitalization -recommended, and declined by the patient as documented above.    Barriers to care at this time, including but not limited to:   Housing Instability -patient is reluctant to undergo hospital admission out of concern for losing his shelter bed, case management was contacted to explore alternative options    Prescription medication considerations and management:  1.  Zyvox prescription provided    Disposition:  Discharged AGAINST MEDICAL ADVICE in fair and stable condition    FINAL IMPRESSION   1. Infection of skin due to methicillin resistant Staphylococcus aureus (MRSA)    2. Abscess of left thumb        FOLLOW UP:  [facility]  [address] [place] [address]  [phone]  Schedule an appointment as soon as possible for a visit       [facility], Emergency Dept  [address] [facility] [address] [phone]  Go to   If symptoms worsen    Oscar Bullock M.D.  [address] [facility] [id number] [phone]    Schedule an appointment as soon as possible for a visit         OUTPATIENT MEDICATIONS:  Discharge Medication List as of 6/13/2024  8:39 AM        START taking these medications    Details   linezolid (ZYVOX) 600 MG Tab Take 1 Tablet by  mouth 2 times a day for 10 days., Disp-20 Tablet, R-0, Normal

## 2024-06-13 NOTE — ED NOTES
Reviewed discharge instructions, patient verbalized understanding. States they will schedule follow up appointment if needed. Pt agreeable to  prescriptions from pharmacy upstairs  and verbalized understanding on how to take medications. Given directions to pharmacy upstairs, pt verbalized that he knows where it's at.     Denies further questions at this time. Pt ambulatory out of ER and towards elevators to pharmacy with steady gait.

## 2024-06-13 NOTE — DISCHARGE INSTRUCTIONS
It is very important you take the antibiotics as prescribed.  Please call the orthopedic clinic listed above to schedule a follow-up appointment in 2 weeks.  Please call the primary care clinic listed above to schedule a follow-up appointment for recheck within 24 to 48 hours.  As we discussed you are welcome and encouraged to return to the emergency department at any time for wound recheck, and consideration for admission.

## 2024-06-13 NOTE — DISCHARGE PLANNING
Contacted by outpatient pharmacist re: PA for outpatient antibiotics    PA sent to [facility] ([place]: [id number]) - [phone]  Linezolid 600MG tablets  Status: PA Response - ApprovedCreated: June 13th, 2024Sent: June 13th, 2024      Met with patient at bedside per ERP request.    Patient states his main concern is making it to work today at the [employer] and will need a work note.    We discuss his infection and proper treatment, he does not seem to fully understand this but I explain in a multitude of ways until I feel that he may understand. He refuses admission for IV antibiotics at this time. He is very insistent on leaving to make it to work today.    He states he will return if needed but will take the p.o. antiobiotics but REALLY needs a work note.    Discussed above with ERP. Her main concern is his ability to keep wound clean while homeless. Patient states to bedside RN that he is a . ERP would like us to guarantee a bed for patient at the [facility]. I re-iterate that this is not possible and patient is on a first come first serve basis and is aware of this.     ERP plans to DC patient at this time.

## 2024-06-16 LAB
BACTERIA BLD CULT: NORMAL
BACTERIA BLD CULT: NORMAL
BACTERIA SPEC ANAEROBE CULT: ABNORMAL
BACTERIA SPEC ANAEROBE CULT: ABNORMAL
SIGNIFICANT IND 70042: ABNORMAL
SIGNIFICANT IND 70042: NORMAL
SIGNIFICANT IND 70042: NORMAL
SITE SITE: ABNORMAL
SITE SITE: NORMAL
SITE SITE: NORMAL
SOURCE SOURCE: ABNORMAL
SOURCE SOURCE: NORMAL
SOURCE SOURCE: NORMAL

## 2024-06-19 LAB
FUNGUS SPEC CULT: NORMAL
FUNGUS SPEC FUNGUS STN: NORMAL
SIGNIFICANT IND 70042: NORMAL
SITE SITE: NORMAL
SOURCE SOURCE: NORMAL

## 2024-08-16 ENCOUNTER — APPOINTMENT (OUTPATIENT)
Dept: RADIOLOGY | Facility: MEDICAL CENTER | Age: 29
DRG: 189 | End: 2024-08-16
Attending: EMERGENCY MEDICINE
Payer: MEDICAID

## 2024-08-16 ENCOUNTER — HOSPITAL ENCOUNTER (INPATIENT)
Facility: MEDICAL CENTER | Age: 29
LOS: 1 days | DRG: 189 | End: 2024-08-17
Attending: EMERGENCY MEDICINE | Admitting: INTERNAL MEDICINE
Payer: MEDICAID

## 2024-08-16 DIAGNOSIS — T40.601A OPIATE OVERDOSE, ACCIDENTAL OR UNINTENTIONAL, INITIAL ENCOUNTER (HCC): ICD-10-CM

## 2024-08-16 DIAGNOSIS — R41.82 ALTERED MENTAL STATUS, UNSPECIFIED ALTERED MENTAL STATUS TYPE: ICD-10-CM

## 2024-08-16 DIAGNOSIS — J96.01 ACUTE RESPIRATORY FAILURE WITH HYPOXIA (HCC): ICD-10-CM

## 2024-08-16 PROBLEM — F11.10 OPIATE ABUSE, EPISODIC (HCC): Status: ACTIVE | Noted: 2024-08-16

## 2024-08-16 PROBLEM — J96.00 ACUTE RESPIRATORY FAILURE (HCC): Status: ACTIVE | Noted: 2024-08-16

## 2024-08-16 LAB
ALBUMIN SERPL BCP-MCNC: 3.9 G/DL (ref 3.2–4.9)
ALBUMIN/GLOB SERPL: 1.3 G/DL
ALP SERPL-CCNC: 92 U/L (ref 30–99)
ALT SERPL-CCNC: 17 U/L (ref 2–50)
ANION GAP SERPL CALC-SCNC: 18 MMOL/L (ref 7–16)
AST SERPL-CCNC: 32 U/L (ref 12–45)
BASOPHILS # BLD AUTO: 0.3 % (ref 0–1.8)
BASOPHILS # BLD: 0.04 K/UL (ref 0–0.12)
BILIRUB SERPL-MCNC: 0.2 MG/DL (ref 0.1–1.5)
BUN SERPL-MCNC: 13 MG/DL (ref 8–22)
CALCIUM ALBUM COR SERPL-MCNC: 8.4 MG/DL (ref 8.5–10.5)
CALCIUM SERPL-MCNC: 8.3 MG/DL (ref 8.5–10.5)
CHLORIDE SERPL-SCNC: 108 MMOL/L (ref 96–112)
CO2 SERPL-SCNC: 17 MMOL/L (ref 20–33)
CREAT SERPL-MCNC: 0.78 MG/DL (ref 0.5–1.4)
EKG IMPRESSION: NORMAL
EOSINOPHIL # BLD AUTO: 0 K/UL (ref 0–0.51)
EOSINOPHIL NFR BLD: 0 % (ref 0–6.9)
ERYTHROCYTE [DISTWIDTH] IN BLOOD BY AUTOMATED COUNT: 48 FL (ref 35.9–50)
GFR SERPLBLD CREATININE-BSD FMLA CKD-EPI: 125 ML/MIN/1.73 M 2
GLOBULIN SER CALC-MCNC: 2.9 G/DL (ref 1.9–3.5)
GLUCOSE SERPL-MCNC: 165 MG/DL (ref 65–99)
HCT VFR BLD AUTO: 42.8 % (ref 42–52)
HGB BLD-MCNC: 15 G/DL (ref 14–18)
IMM GRANULOCYTES # BLD AUTO: 0.04 K/UL (ref 0–0.11)
IMM GRANULOCYTES NFR BLD AUTO: 0.3 % (ref 0–0.9)
LYMPHOCYTES # BLD AUTO: 1.15 K/UL (ref 1–4.8)
LYMPHOCYTES NFR BLD: 9.5 % (ref 22–41)
MCH RBC QN AUTO: 31.8 PG (ref 27–33)
MCHC RBC AUTO-ENTMCNC: 35 G/DL (ref 32.3–36.5)
MCV RBC AUTO: 90.7 FL (ref 81.4–97.8)
MONOCYTES # BLD AUTO: 0.95 K/UL (ref 0–0.85)
MONOCYTES NFR BLD AUTO: 7.8 % (ref 0–13.4)
NEUTROPHILS # BLD AUTO: 9.97 K/UL (ref 1.82–7.42)
NEUTROPHILS NFR BLD: 82.1 % (ref 44–72)
NRBC # BLD AUTO: 0 K/UL
NRBC BLD-RTO: 0 /100 WBC (ref 0–0.2)
PLATELET # BLD AUTO: 221 K/UL (ref 164–446)
PMV BLD AUTO: 10.2 FL (ref 9–12.9)
POTASSIUM SERPL-SCNC: 3.2 MMOL/L (ref 3.6–5.5)
PROT SERPL-MCNC: 6.8 G/DL (ref 6–8.2)
RBC # BLD AUTO: 4.72 M/UL (ref 4.7–6.1)
SODIUM SERPL-SCNC: 143 MMOL/L (ref 135–145)
TROPONIN T SERPL-MCNC: 32 NG/L (ref 6–19)
WBC # BLD AUTO: 12.2 K/UL (ref 4.8–10.8)

## 2024-08-16 PROCEDURE — 96365 THER/PROPH/DIAG IV INF INIT: CPT

## 2024-08-16 PROCEDURE — 700111 HCHG RX REV CODE 636 W/ 250 OVERRIDE (IP): Mod: JZ,UD

## 2024-08-16 PROCEDURE — 80053 COMPREHEN METABOLIC PANEL: CPT

## 2024-08-16 PROCEDURE — 85025 COMPLETE CBC W/AUTO DIFF WBC: CPT

## 2024-08-16 PROCEDURE — 700111 HCHG RX REV CODE 636 W/ 250 OVERRIDE (IP): Mod: JZ,UD | Performed by: EMERGENCY MEDICINE

## 2024-08-16 PROCEDURE — 96376 TX/PRO/DX INJ SAME DRUG ADON: CPT

## 2024-08-16 PROCEDURE — 84484 ASSAY OF TROPONIN QUANT: CPT

## 2024-08-16 PROCEDURE — 36415 COLL VENOUS BLD VENIPUNCTURE: CPT

## 2024-08-16 PROCEDURE — 71045 X-RAY EXAM CHEST 1 VIEW: CPT

## 2024-08-16 PROCEDURE — 700101 HCHG RX REV CODE 250: Mod: UD | Performed by: EMERGENCY MEDICINE

## 2024-08-16 PROCEDURE — 96366 THER/PROPH/DIAG IV INF ADDON: CPT

## 2024-08-16 PROCEDURE — 770022 HCHG ROOM/CARE - ICU (200)

## 2024-08-16 PROCEDURE — 700111 HCHG RX REV CODE 636 W/ 250 OVERRIDE (IP): Mod: JZ | Performed by: INTERNAL MEDICINE

## 2024-08-16 PROCEDURE — 99292 CRITICAL CARE ADDL 30 MIN: CPT | Performed by: INTERNAL MEDICINE

## 2024-08-16 PROCEDURE — 93005 ELECTROCARDIOGRAM TRACING: CPT

## 2024-08-16 PROCEDURE — 99291 CRITICAL CARE FIRST HOUR: CPT | Performed by: INTERNAL MEDICINE

## 2024-08-16 PROCEDURE — 700105 HCHG RX REV CODE 258: Mod: UD | Performed by: EMERGENCY MEDICINE

## 2024-08-16 PROCEDURE — 700105 HCHG RX REV CODE 258: Performed by: INTERNAL MEDICINE

## 2024-08-16 PROCEDURE — 99291 CRITICAL CARE FIRST HOUR: CPT

## 2024-08-16 PROCEDURE — 96375 TX/PRO/DX INJ NEW DRUG ADDON: CPT

## 2024-08-16 RX ORDER — DEXMEDETOMIDINE HYDROCHLORIDE 4 UG/ML
.1-1.5 INJECTION, SOLUTION INTRAVENOUS CONTINUOUS
Status: DISCONTINUED | OUTPATIENT
Start: 2024-08-16 | End: 2024-08-16

## 2024-08-16 RX ORDER — NALOXONE HYDROCHLORIDE 1 MG/ML
2 INJECTION INTRAMUSCULAR; INTRAVENOUS; SUBCUTANEOUS ONCE
Status: COMPLETED | OUTPATIENT
Start: 2024-08-16 | End: 2024-08-16

## 2024-08-16 RX ORDER — HALOPERIDOL 5 MG/ML
5 INJECTION INTRAMUSCULAR ONCE
Status: COMPLETED | OUTPATIENT
Start: 2024-08-16 | End: 2024-08-16

## 2024-08-16 RX ORDER — ENOXAPARIN SODIUM 100 MG/ML
40 INJECTION SUBCUTANEOUS DAILY
Status: DISCONTINUED | OUTPATIENT
Start: 2024-08-16 | End: 2024-08-17 | Stop reason: HOSPADM

## 2024-08-16 RX ORDER — HALOPERIDOL 5 MG/ML
5 INJECTION INTRAMUSCULAR ONCE
Status: DISCONTINUED | OUTPATIENT
Start: 2024-08-16 | End: 2024-08-16

## 2024-08-16 RX ORDER — HALOPERIDOL 5 MG/ML
INJECTION INTRAMUSCULAR
Status: COMPLETED
Start: 2024-08-16 | End: 2024-08-16

## 2024-08-16 RX ORDER — NALOXONE HYDROCHLORIDE 1 MG/ML
2 INJECTION INTRAMUSCULAR; INTRAVENOUS; SUBCUTANEOUS
Status: DISCONTINUED | OUTPATIENT
Start: 2024-08-16 | End: 2024-08-17 | Stop reason: HOSPADM

## 2024-08-16 RX ORDER — NALOXONE HYDROCHLORIDE 1 MG/ML
INJECTION INTRAMUSCULAR; INTRAVENOUS; SUBCUTANEOUS
Status: COMPLETED
Start: 2024-08-16 | End: 2024-08-16

## 2024-08-16 RX ORDER — HALOPERIDOL 5 MG/ML
10 INJECTION INTRAMUSCULAR ONCE
Status: COMPLETED | OUTPATIENT
Start: 2024-08-16 | End: 2024-08-16

## 2024-08-16 RX ADMIN — NALOXONE HYDROCHLORIDE 2 MG/HR: 1 INJECTION, SOLUTION INTRAMUSCULAR; INTRAVENOUS; SUBCUTANEOUS at 17:31

## 2024-08-16 RX ADMIN — HALOPERIDOL LACTATE 5 MG: 5 INJECTION, SOLUTION INTRAMUSCULAR at 16:04

## 2024-08-16 RX ADMIN — DEXMEDETOMIDINE HYDROCHLORIDE 0.2 MCG/KG/HR: 100 INJECTION, SOLUTION, CONCENTRATE INTRAVENOUS at 15:26

## 2024-08-16 RX ADMIN — NALOXONE HYDROCHLORIDE 2 MG: 1 INJECTION, SOLUTION INTRAMUSCULAR; INTRAVENOUS; SUBCUTANEOUS at 15:07

## 2024-08-16 RX ADMIN — NALOXONE HYDROCHLORIDE 2 MG: 1 INJECTION, SOLUTION INTRAMUSCULAR; INTRAVENOUS; SUBCUTANEOUS at 15:42

## 2024-08-16 RX ADMIN — NALOXONE HYDROCHLORIDE: 1 INJECTION INTRAMUSCULAR; INTRAVENOUS; SUBCUTANEOUS at 14:59

## 2024-08-16 RX ADMIN — ENOXAPARIN SODIUM 40 MG: 100 INJECTION SUBCUTANEOUS at 18:29

## 2024-08-16 RX ADMIN — NALOXONE HYDROCHLORIDE 2 MG: 1 INJECTION, SOLUTION INTRAMUSCULAR; INTRAVENOUS; SUBCUTANEOUS at 15:15

## 2024-08-16 RX ADMIN — HALOPERIDOL 10 MG: 5 INJECTION INTRAMUSCULAR at 15:07

## 2024-08-16 RX ADMIN — HALOPERIDOL LACTATE 10 MG: 5 INJECTION, SOLUTION INTRAMUSCULAR at 15:07

## 2024-08-16 RX ADMIN — NALOXONE HYDROCHLORIDE: 1 INJECTION, SOLUTION INTRAMUSCULAR; INTRAVENOUS; SUBCUTANEOUS at 14:59

## 2024-08-16 ASSESSMENT — FIBROSIS 4 INDEX: FIB4 SCORE: 0.69

## 2024-08-16 NOTE — ED NOTES
Patient became unresponsive with decreased RR upon ER arrival after moving to ER [facility]. 2 mg IV Narcan administered with Dr. Lara at bedside. Patient regained consciousness and became physically aggressive with staff at bedside and began to remove all monitoring equipment. Patient AAO x 0. A few minutes after Narcan administration patient became unresponsive again with RR 4. A second dose of 2 mg IV Narcan administered with Dr. Lara at bedside. RR increased to 12 and patient became alert and combative again. Medicated with 10 mg IV Haldol with MD at bedside. Patient transported to [facility]. Patient became unresponsive with RR 4 again while in transit to [facility]. Third dose of 2 mg IV narcan administered at bedside in Red 8 with MD and receiving RN John at bedside. Report to John, MIRELLA, patient care transferred.

## 2024-08-16 NOTE — ED NOTES
Pt remains agitated; combative. 4 pt hard restraints applied by security per MD order. 5 mg IV push haldol ordered and administered, slow IV push. Cms remains intact following restraint placement.

## 2024-08-16 NOTE — ED NOTES
Pt awake and said he would be cooperative and is asking to get his restraints released. Security called and will remove 1 wrist and 1 ankle.

## 2024-08-16 NOTE — ED PROVIDER NOTES
"ER Provider Note    Scribed for Dr. Jerald Lara M.D. by Cornelius Stephens. 8/16/2024  3:13 PM    Primary Care Provider: Pcp Pt States None    CHIEF COMPLAINT   Chief Complaint   Patient presents with    ALOC     BIB EMS from [facility] for ALOC. [facility] staff medicated patient with 8 mg IN Narcan after becoming unresponsive following witnessed smoking Fentanyl    Hypotension     BP 80/40 on EMS arrival at [facility]. 500 mL NS administered by EMS PTA.  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     HPI/ROS  LIMITATION TO HISTORY   Select: Behavior  OUTSIDE HISTORIAN(S):  EMS provided much of the history of the patient as seen below.    Sivakumar Levi is a 28 y.o. male who presents to the ED by EMS for evaluation of a possible overdose onset prior to arrival.  EMS reports that the patient is from the [facility] where staff and bystanders witnessed the patient smoking fentanyl and drinking alcohol. EMS notes that when they arrived, the patient was unresponsive at which time they gave 8 intranasal naloxone. EMS adds that he had a blood pressure of 80 systolic when they found him. Since his arrival here, he has been combative when awake, attempting to physically hit staff members and yelling phrases such as \"you have no right to keep me\", \"get off me\" and \"leave me alone\". He has also ripped off his pulse ox and attempted to rip out his IV when awake. Patient has not been cooperative and will not say his name. 4 police officers were required to hold the patient down in order to protect hospital staff and EMS. Verbal de escalation has been unsuccessful thus far. Denies drug use today.    PAST MEDICAL HISTORY  No past medical history noted    SURGICAL HISTORY  Past Surgical History:   Procedure Laterality Date    IRRIGATION & " "DEBRIDEMENT GENERAL Left 6/11/2024    Procedure: IRRIGATION AND DEBRIDEMENT, LEFT THUMB ABSCESS;  Surgeon: Oscar Bullock M.D.;  Location: SURGERY [facility];  Service: Orthopedics    OTHER      small tumor removed from throat as a baby     FAMILY HISTORY  No family history noted    SOCIAL HISTORY   reports that he has been smoking cigarettes. He has never used smokeless tobacco. He reports current alcohol use. He reports that he does not currently use drugs.    CURRENT MEDICATIONS  There are no discharge medications for this patient.    ALLERGIES  Patient has no known allergies.    PHYSICAL EXAM  Ht 1.702 m (5' 7\")   Wt 55.3 kg (122 lb)   BMI 19.11 kg/m²   Constitutional: Combative, verbally abusing staff members when awake but otherwise unresponsive. Ill appearing.  HENT: No signs of trauma, Bilateral external ears normal, Nose normal. Uvula midline.   Eyes: Pupils are equal and reactive, Conjunctiva normal, Non-icteric.   Neck: Normal range of motion, No tenderness, Supple, No stridor.   Lymphatic: No lymphadenopathy noted.   Cardiovascular: Tachycardic and regular rhythm, no murmurs.   Thorax & Lungs:  No wheezing, No chest tenderness. Apneic.  Abdomen: Bowel sounds normal, Soft, No tenderness, No peritoneal signs, No masses, No pulsatile masses.   Skin: Warm, Dry, No erythema, No rash.   Back: No bony tenderness, No CVA tenderness.   Extremities: Intact distal pulses, No edema, No tenderness, No cyanosis.  Musculoskeletal: Good range of motion in all major joints. No tenderness to palpation or major deformities noted.   Neurologic: Alert , Normal motor function, Normal sensory function, No focal deficits noted.   Psychiatric: See history.    DIAGNOSTIC STUDIES    EKG/LABS  Results for orders placed or performed during the hospital encounter of 08/16/24   CBC WITH DIFFERENTIAL   Result Value Ref Range    WBC 12.2 (H) 4.8 - 10.8 K/uL    RBC 4.72 4.70 - 6.10 M/uL    Hemoglobin 15.0 14.0 - 18.0 g/dL    " Hematocrit 42.8 42.0 - 52.0 %    MCV 90.7 81.4 - 97.8 fL    MCH 31.8 27.0 - 33.0 pg    MCHC 35.0 32.3 - 36.5 g/dL    RDW 48.0 35.9 - 50.0 fL    Platelet Count 221 164 - 446 K/uL    MPV 10.2 9.0 - 12.9 fL    Neutrophils-Polys 82.10 (H) 44.00 - 72.00 %    Lymphocytes 9.50 (L) 22.00 - 41.00 %    Monocytes 7.80 0.00 - 13.40 %    Eosinophils 0.00 0.00 - 6.90 %    Basophils 0.30 0.00 - 1.80 %    Immature Granulocytes 0.30 0.00 - 0.90 %    Nucleated RBC 0.00 0.00 - 0.20 /100 WBC    Neutrophils (Absolute) 9.97 (H) 1.82 - 7.42 K/uL    Lymphs (Absolute) 1.15 1.00 - 4.80 K/uL    Monos (Absolute) 0.95 (H) 0.00 - 0.85 K/uL    Eos (Absolute) 0.00 0.00 - 0.51 K/uL    Baso (Absolute) 0.04 0.00 - 0.12 K/uL    Immature Granulocytes (abs) 0.04 0.00 - 0.11 K/uL    NRBC (Absolute) 0.00 K/uL   CMP   Result Value Ref Range    Sodium 143 135 - 145 mmol/L    Potassium 3.2 (L) 3.6 - 5.5 mmol/L    Chloride 108 96 - 112 mmol/L    Co2 17 (L) 20 - 33 mmol/L    Anion Gap 18.0 (H) 7.0 - 16.0    Glucose 165 (H) 65 - 99 mg/dL    Bun 13 8 - 22 mg/dL    Creatinine 0.78 0.50 - 1.40 mg/dL    Calcium 8.3 (L) 8.5 - 10.5 mg/dL    Correct Calcium 8.4 (L) 8.5 - 10.5 mg/dL    AST(SGOT) 32 12 - 45 U/L    ALT(SGPT) 17 2 - 50 U/L    Alkaline Phosphatase 92 30 - 99 U/L    Total Bilirubin 0.2 0.1 - 1.5 mg/dL    Albumin 3.9 3.2 - 4.9 g/dL    Total Protein 6.8 6.0 - 8.2 g/dL    Globulin 2.9 1.9 - 3.5 g/dL    A-G Ratio 1.3 g/dL   ESTIMATED GFR   Result Value Ref Range    GFR (CKD-EPI) 125 >60 mL/min/1.73 m 2   TROPONIN   Result Value Ref Range    Troponin T 32 (H) 6 - 19 ng/L   EKG   Result Value Ref Range    Report       [facility]    Test Date:  2024  Pt Name:    FREDDIEEUGENIO KATZ                 Department: ER  MRN:        [id number]                      Room:       [facility]  Gender:     Male                         Technician: [id number]  :        1995                   Requested By:VINAY HARDWICK  Order #:    [id number]                     Reading MD:    Measurements  Intervals                                Axis  Rate:       108                          P:          88  WI:         133                          QRS:        96  QRSD:       88                           T:          76  QT:         354  QTc:        475    Interpretive Statements  Sinus tachycardia  Biatrial enlargement  Borderline right axis deviation  Abnormal lateral Q waves  Borderline ST elevation, anterior leads  No previous ECG available for comparison         I have independently interpreted this EKG    RADIOLOGY/PROCEDURES   The attending emergency physician has independently interpreted the diagnostic imaging associated with this visit and am waiting the final reading from the radiologist.   My preliminary interpretation is a follows: no PTX    Radiologist interpretation:  DX-CHEST-PORTABLE (1 VIEW)   Final Result      No acute cardiac or pulmonary abnormalities are identified.        COURSE & MEDICAL DECISION MAKING     ASSESSMENT, COURSE AND PLAN  Care Narrative:   Overdose: Mr. Levi was here with a suspected overdose of T40.2 - Other opioids; he has no other known overdoses.        2:55 PM - Patient was seen and evaluated at bedside. Patient presents to the ED for evaluation after being found unresponsive with witnesses reporting he was smoking fentanyl and drinking alcohol. EMS gave him 8 mg of intranasal naloxone when the arrived to the scene. He has been combative when awake and attempting to hit staff. Patient is also verbally abusing staff and attempting to rip out his IV. He has been given 3 injections of naloxone since presenting here. After my exam, I discussed with the patient the plan of care, which includes treating the patient with medication for their symptoms, as well as obtaining lab work and imaging for further evaluation. Patient understands and verbalizes agreement to plan of care. Patient will be treated with 3 2mg naloxone injection, Precedex 400  "mcg/100mL infusion, and two Haldol injections the first being 10 mg and the second 5 mg. Ordered DX-Chest-Portable, CBC w/ diff., and CMP to evaluate.   Differential diagnoses include but not limited to:     #opiate overdose  Patient with suspected fentanyl overdose with repeated episodes of apnea in emergency department  Patient combative with staff upon arousal however no evidence of trauma  Patient given Precedex to help with combative behavior and multiple doses of IV Narcan required to help patient not need oxygen and maintain his respiratory drive  Without numerous reassessments patient would likely have suffered respiratory arrest and/or death  Patient required numerous IV doses of Narcan and eventually on Narcan drip requiring ICU admission    Patient had no capacity despite multiple repeated statements towards staff, so it is unsafe to discharge him. In an attempt to keep the patient alive and provide care, he was sedated with Precedex and haloperidol.    3:25 PM - Patient was reevaluated at bedside. He was given another 2 mg of naloxone at this time.    3:46 PM - Patient was reevaluated at bedside. He was given another 2 mg of naloxone and he started to wake up and yell \"fuck you\" at the staff. Patient was noted to be apneic as well.    3:59 PM - Patient was reevaluated at bedside. Another 5 mg of Haldol was administered at this time and multiple more de-escalation attempts were attempted. His nurse John and pharmacy were assisting with the patient at this time.      4:21 PM - Order put in for ICU bed. I informed the patient of my plan to admit today given the patient's current presentation and diagnostic study results. Patient verbalizes understanding and support with my plan for admission.    4:42 PM - I discussed the patient's case and the above findings with Dr. Archuleta (Hospitalist) who agreed to hospitalize the patient. Patient's care was transferred at this time.      DISPOSITION AND DISCUSSIONS  I " have discussed management of the patient with the following physicians and ELLIS's:  Dr. Archuleta (Hospitalist)     Discussion of management with other [facility] or appropriate source(s): Pharmacy        Barriers to care at this time, including but not limited to: Patient does not have established PCP and Patient is homeless.     CRITICAL CARE  The very real possibilty of a deterioration of this patient's condition required the highest level of my preparedness for sudden, emergent intervention.  I provided critical care services, which included medication orders, frequent reevaluations of the patient's condition and response to treatment, ordering and reviewing test results, and discussing the case with various consultants.  The critical care time associated with the care of the patient was 80 minutes. Review chart for interventions. This time is exclusive of any other billable procedures.     DISPOSITION:  Patient will be hospitalized by Dr. Archuleta (Hospitalist) in guarded condition.    FINAL DIANGOSIS  1. Altered mental status, unspecified altered mental status type    2. Acute respiratory failure with hypoxia (HCC)    3. Opiate overdose, accidental or unintentional, initial encounter (HCC)       ICornelius (Ivana), am scribing for, and in the presence of, Dr. Jerald Lara M.D..    Electronically signed by: Cornelius Stephens (Jime), 8/16/2024    IDr. Jerald M.D. personally performed the services described in this documentation, as scribed by Cornelius Stephens in my presence, and it is both accurate and complete.     The note accurately reflects work and decisions made by me.  Jerald Lara M.D.  8/16/2024  10:46 PM

## 2024-08-16 NOTE — ED NOTES
Pt now sleeping, reaps even and unlabored at a rate of 16. Spo2 decreased to 90% on room air following haldol adminMD Lara notified. Oxygen applied at 2P/min via NC. Report to break MIRELLA Desouza.

## 2024-08-16 NOTE — ED NOTES
Report received from MIRELLA Ball; pt moved to [facility] 8 d/t acuity. Pt unresponsive with low respiratory rate at time of transfer; 2 mg narcan ordered by MD Lara at bedside; administered. Pt tolerated well. On arrival pt was combative, attempting to assault staff. Pt oriented to self and year, otherwise disoriented. Pt denies SI, states he did not attempt to overdose.

## 2024-08-16 NOTE — ED NOTES
Patient unable to participate in interview. [facility] on file. Last dispense 2 ABX back in 6/2024. No records of any other medications dispensed.

## 2024-08-17 ENCOUNTER — HOSPITAL ENCOUNTER (EMERGENCY)
Facility: MEDICAL CENTER | Age: 29
DRG: 189 | End: 2024-08-17
Attending: EMERGENCY MEDICINE
Payer: MEDICAID

## 2024-08-17 ENCOUNTER — APPOINTMENT (OUTPATIENT)
Dept: RADIOLOGY | Facility: MEDICAL CENTER | Age: 29
DRG: 189 | End: 2024-08-17
Attending: EMERGENCY MEDICINE
Payer: MEDICAID

## 2024-08-17 VITALS
WEIGHT: 122 LBS | TEMPERATURE: 98 F | BODY MASS INDEX: 19.15 KG/M2 | HEIGHT: 67 IN | RESPIRATION RATE: 16 BRPM | OXYGEN SATURATION: 93 % | SYSTOLIC BLOOD PRESSURE: 129 MMHG | HEART RATE: 117 BPM | DIASTOLIC BLOOD PRESSURE: 71 MMHG

## 2024-08-17 VITALS
WEIGHT: 122 LBS | TEMPERATURE: 98.4 F | SYSTOLIC BLOOD PRESSURE: 110 MMHG | DIASTOLIC BLOOD PRESSURE: 62 MMHG | OXYGEN SATURATION: 93 % | RESPIRATION RATE: 15 BRPM | BODY MASS INDEX: 19.15 KG/M2 | HEART RATE: 93 BPM | HEIGHT: 67 IN

## 2024-08-17 DIAGNOSIS — F10.921 ALCOHOL INTOXICATION WITH DELIRIUM (HCC): ICD-10-CM

## 2024-08-17 LAB
ALBUMIN SERPL BCP-MCNC: 3.7 G/DL (ref 3.2–4.9)
ALBUMIN/GLOB SERPL: 2.6 G/DL
ALP SERPL-CCNC: 90 U/L (ref 30–99)
ALT SERPL-CCNC: 15 U/L (ref 2–50)
AMPHET UR QL SCN: NEGATIVE
ANION GAP SERPL CALC-SCNC: 16 MMOL/L (ref 7–16)
AST SERPL-CCNC: 28 U/L (ref 12–45)
BARBITURATES UR QL SCN: NEGATIVE
BASOPHILS # BLD AUTO: 0.4 % (ref 0–1.8)
BASOPHILS # BLD: 0.04 K/UL (ref 0–0.12)
BENZODIAZ UR QL SCN: NEGATIVE
BILIRUB SERPL-MCNC: <0.2 MG/DL (ref 0.1–1.5)
BUN SERPL-MCNC: 11 MG/DL (ref 8–22)
BZE UR QL SCN: NEGATIVE
CALCIUM ALBUM COR SERPL-MCNC: 8.5 MG/DL (ref 8.5–10.5)
CALCIUM SERPL-MCNC: 8.3 MG/DL (ref 8.5–10.5)
CANNABINOIDS UR QL SCN: NEGATIVE
CHLORIDE SERPL-SCNC: 105 MMOL/L (ref 96–112)
CO2 SERPL-SCNC: 19 MMOL/L (ref 20–33)
CREAT SERPL-MCNC: 0.55 MG/DL (ref 0.5–1.4)
EKG IMPRESSION: NORMAL
EKG IMPRESSION: NORMAL
EOSINOPHIL # BLD AUTO: 0.03 K/UL (ref 0–0.51)
EOSINOPHIL NFR BLD: 0.3 % (ref 0–6.9)
ERYTHROCYTE [DISTWIDTH] IN BLOOD BY AUTOMATED COUNT: 48.9 FL (ref 35.9–50)
ETHANOL BLD-MCNC: 414 MG/DL
FENTANYL UR QL: NEGATIVE
GFR SERPLBLD CREATININE-BSD FMLA CKD-EPI: 139 ML/MIN/1.73 M 2
GLOBULIN SER CALC-MCNC: 1.4 G/DL (ref 1.9–3.5)
GLUCOSE SERPL-MCNC: 234 MG/DL (ref 65–99)
HCT VFR BLD AUTO: 39.7 % (ref 42–52)
HGB BLD-MCNC: 13.7 G/DL (ref 14–18)
IMM GRANULOCYTES # BLD AUTO: 0.03 K/UL (ref 0–0.11)
IMM GRANULOCYTES NFR BLD AUTO: 0.3 % (ref 0–0.9)
LYMPHOCYTES # BLD AUTO: 1.69 K/UL (ref 1–4.8)
LYMPHOCYTES NFR BLD: 16.3 % (ref 22–41)
MCH RBC QN AUTO: 31.9 PG (ref 27–33)
MCHC RBC AUTO-ENTMCNC: 34.5 G/DL (ref 32.3–36.5)
MCV RBC AUTO: 92.3 FL (ref 81.4–97.8)
METHADONE UR QL SCN: NEGATIVE
MONOCYTES # BLD AUTO: 0.82 K/UL (ref 0–0.85)
MONOCYTES NFR BLD AUTO: 7.9 % (ref 0–13.4)
NEUTROPHILS # BLD AUTO: 7.74 K/UL (ref 1.82–7.42)
NEUTROPHILS NFR BLD: 74.8 % (ref 44–72)
NRBC # BLD AUTO: 0 K/UL
NRBC BLD-RTO: 0 /100 WBC (ref 0–0.2)
OPIATES UR QL SCN: NEGATIVE
OXYCODONE UR QL SCN: NEGATIVE
PCP UR QL SCN: NEGATIVE
PLATELET # BLD AUTO: 191 K/UL (ref 164–446)
PMV BLD AUTO: 10.4 FL (ref 9–12.9)
POTASSIUM SERPL-SCNC: 3.5 MMOL/L (ref 3.6–5.5)
PROPOXYPH UR QL SCN: NEGATIVE
PROT SERPL-MCNC: 5.1 G/DL (ref 6–8.2)
RBC # BLD AUTO: 4.3 M/UL (ref 4.7–6.1)
SODIUM SERPL-SCNC: 140 MMOL/L (ref 135–145)
WBC # BLD AUTO: 10.4 K/UL (ref 4.8–10.8)

## 2024-08-17 PROCEDURE — 85025 COMPLETE CBC W/AUTO DIFF WBC: CPT

## 2024-08-17 PROCEDURE — 99285 EMERGENCY DEPT VISIT HI MDM: CPT

## 2024-08-17 PROCEDURE — 80307 DRUG TEST PRSMV CHEM ANLYZR: CPT

## 2024-08-17 PROCEDURE — 70450 CT HEAD/BRAIN W/O DYE: CPT

## 2024-08-17 PROCEDURE — 96372 THER/PROPH/DIAG INJ SC/IM: CPT

## 2024-08-17 PROCEDURE — 82077 ASSAY SPEC XCP UR&BREATH IA: CPT

## 2024-08-17 PROCEDURE — 700111 HCHG RX REV CODE 636 W/ 250 OVERRIDE (IP): Mod: JZ,UD

## 2024-08-17 PROCEDURE — 93005 ELECTROCARDIOGRAM TRACING: CPT | Performed by: EMERGENCY MEDICINE

## 2024-08-17 PROCEDURE — 93005 ELECTROCARDIOGRAM TRACING: CPT

## 2024-08-17 PROCEDURE — 80053 COMPREHEN METABOLIC PANEL: CPT

## 2024-08-17 RX ORDER — FAMOTIDINE 20 MG/1
20 TABLET, FILM COATED ORAL ONCE
Status: DISCONTINUED | OUTPATIENT
Start: 2024-08-17 | End: 2024-08-17 | Stop reason: HOSPADM

## 2024-08-17 RX ORDER — HALOPERIDOL 5 MG/ML
10 INJECTION INTRAMUSCULAR
Status: DISCONTINUED | OUTPATIENT
Start: 2024-08-17 | End: 2024-08-17

## 2024-08-17 RX ORDER — HALOPERIDOL 5 MG/ML
10 INJECTION INTRAMUSCULAR
Status: DISCONTINUED | OUTPATIENT
Start: 2024-08-17 | End: 2024-08-17 | Stop reason: HOSPADM

## 2024-08-17 RX ADMIN — HALOPERIDOL LACTATE 10 MG: 5 INJECTION, SOLUTION INTRAMUSCULAR at 14:38

## 2024-08-17 ASSESSMENT — FIBROSIS 4 INDEX: FIB4 SCORE: 0.95

## 2024-08-17 NOTE — ED TRIAGE NOTES
Pt BIB EMS for   Chief Complaint   Patient presents with    ALOC     Patient walked into [facility] and slumped over. Given 16mg of narcan, responsive to pain. On 3L oxygen at 98%.       Hx OD, fsbg 197. NPA in place by ems.

## 2024-08-17 NOTE — ASSESSMENT & PLAN NOTE
Due to opiate intoxication  End tidal CO2 detector monitoring  Titrate Narcan infusion to maintain goal RR>12 and RASS>-2

## 2024-08-17 NOTE — ED NOTES
Assist RN: pt assisted to monitoring equipment. Resting w/ even chest rise and fall. Bed alarm in place for safety.

## 2024-08-17 NOTE — DISCHARGE SUMMARY
"Discharge Summary    CHIEF COMPLAINT ON ADMISSION  Chief Complaint   Patient presents with    ALOC     BIB EMS from [facility] for ALOC. [facility] staff medicated patient with 8 mg IN Narcan after becoming unresponsive following witnessed smoking Fentanyl    Hypotension     BP 80/40 on EMS arrival at [facility]. 500 mL NS administered by EMS PTA.  on ER arrival       Reason for Admission  ems     Admission Date  8/16/2024    CODE STATUS  Full Code    HPI & HOSPITAL COURSE  28 y.o. male who was at the [facility] and was witnessed by staff to \"smoke\" fentanyl, he then presented 8/16/2024 with altered mental status and hypoventilation. EMS gave Narcan IN a total of 8 mg and in the ED he received anther 8mg IV Narcan for transient recurrence of apneic episodes. Every time he awakes he becomes violent and required hard restraints. Critical care consultation has been requested for further evaluation and management.     Narcan infusion has been off for 8 hours. Patient is a/o x 4, understanding of his current situation reason for admission and ambulating well w/o assist, normal vitals on room air. He would like to leave and has capacity. He is heading back to [facility].    Therefore, he is discharged in good and stable condition.  I informed him of his near death encounter from smoking Fentanyl and counseled him regarding ongoing risks.      DISCHARGE DIAGNOSES  Principal Problem:    Acute respiratory failure (HCC) (POA: Yes)  Active Problems:    Alcohol dependence (HCC) (POA: Yes)    Opiate abuse, episodic (HCC) (POA: Unknown)  Resolved Problems:    * No resolved hospital problems. *      FOLLOW UP  No future appointments.  No follow-up provider specified.    MEDICATIONS ON DISCHARGE     Medication List      You have not been prescribed any medications.         Allergies  No Known Allergies    DIET  Orders Placed This Encounter   Procedures    Diet Order Diet: Regular     Standing Status:   Standing     " Number of Occurrences:   1     Order Specific Question:   Diet:     Answer:   Regular [1]         LABORATORY  Lab Results   Component Value Date    SODIUM 143 08/16/2024    POTASSIUM 3.2 (L) 08/16/2024    CHLORIDE 108 08/16/2024    CO2 17 (L) 08/16/2024    GLUCOSE 165 (H) 08/16/2024    BUN 13 08/16/2024    CREATININE 0.78 08/16/2024        Lab Results   Component Value Date    WBC 12.2 (H) 08/16/2024    HEMOGLOBIN 15.0 08/16/2024    HEMATOCRIT 42.8 08/16/2024    PLATELETCT 221 08/16/2024

## 2024-08-17 NOTE — ED NOTES
Handoff report received from Rosanna VU. Pt resting in bed, breathing even and unlabored. Pt satting 94% on RA with NPA in place. Pt not arousable to verbal stimuli at this time, monitoring in place.

## 2024-08-17 NOTE — ED NOTES
Lab called with critical result of etoh of 414.0 at 1158. Critical lab result read back to lab.   Dr. Smith notified of critical lab result at 1201.  Critical lab result read back by Dr. Smith.

## 2024-08-17 NOTE — ED NOTES
"Pt continually attempting to get off of gurney. Pt stating \"show me the exit I'm leaving\". RN x 2 attempted ambulation trial, pt unsteady on his feet and requiring assistance to stay standing. Pt refusing to sit on gurney and becoming agitated, ERP at bedside. New orders received. Security at bedside and pt medicated per MAR. Pt back on gurney and placed on monitoring.   "

## 2024-08-17 NOTE — PROGRESS NOTES
"Pt stating \" I want to leave against medical advice\". Pt currently in restraints. Reset expectations that I want him to be safe and I can take his restraints off if he is safe to himself and me. Security called for standby, restraints removed.   Dr. Archuleta notified of pt request. Order to stop narcan gtt at this time.   "

## 2024-08-17 NOTE — ED NOTES
Pt bed alarm went off. Pt sitting at edge of bed stating he would like to leave. Pt educated on the purpose of his visit, agrees to stay until he jun. Pt unsteady on his feet. Pt returned to bed and given T-shirt. No further needs at this time.

## 2024-08-17 NOTE — ED NOTES
"Pt woke up and is thrashing in bed moaning, pt suddenly screamed \"I have to pee\" pt assisted with urinal. Urine sent to lab. Pt resting, eyes closed, with even chest rise and fall, call light available and in reach. Bed alarm remains on.   "

## 2024-08-17 NOTE — ED PROVIDER NOTES
RADIOLOGY:  CT-HEAD W/O   Final Result         1. No acute intracranial abnormality. No evidence of acute intracranial hemorrhage or mass lesion.                               MDM:    Sivakumar is a 28 y.o. male who was evaluated at [facility] with toxic encephalopathy and alcohol intoxication     2:27 PM - Patient presents to the ED with alcohol intoxication onset prior to arrival. Per protocol, CBC w/ dif, POCT glucose docked device, CMP, Diagnostic alcohol and urine drug screen were ordered. Patient will be treated with Pepcid 20 mg. Ordered for CT-head without and EKG to evaluate his symptoms specifically altered mentation disproportionate to alcohol level.     Patient began to try to leave the emergency department and is confused encephalopathic state  Multiple verbal attempts at de-escalation were had which were unsuccessful and patient required chemical sedation and close cardiac monitoring     CRITICAL CARE  The very real possibilty of a deterioration of this patient's condition required the highest level of my preparedness for sudden, emergent intervention.  I provided critical care services, which included medication orders, frequent reevaluations of the patient's condition and response to treatment, ordering and reviewing test results.    Given profound level of intoxication and encephalopathy patient unable to follow commands for his own safety and began to try to walk which may have been fatal resulting in walking into traffic or close head injury.  Patient was unable to speak his name or follow commands and given haloperidol at this time to help facilitate patient's safe transition to sobriety.  At which time patient placed on cardiac monitor along with respiratory support and oxygen.  Frequent reassessments were required given patient's level of intoxication to ensure he did not end up like numerous other intoxicated people who vomit and choke on their vomit and die.     The critical care time associated  with the care of the patient was 35 minutes. Review chart for interventions. This time is exclusive of any other billable procedures.       4:24 PM - Patient was reevaluated at bedside. Patient is alert and the monitor is on.      8:07 PM - I reevaluated the patient at bedside. I discussed the patient's diagnostic study results at this time. I discussed plan for discharge and follow up as outlined below. The patient is stable for discharge at this time and will return for any new or worsening symptoms. Patient verbalizes understanding and support with my plan for discharge.       Selene ARENAS (Ivana), am scribing for, and in the presence of, Jerald Lara M.D..    Electronically signed by: Selene Manzanares (Ivana), 8/17/2024    Jerald ARENAS M.D. personally performed the services described in this documentation, as scribed by Selene Manzanares in my presence, and it is both accurate and complete.

## 2024-08-17 NOTE — ED NOTES
Report given to MIRELLA Seymour; awaiting narcan gtt from  pharmacy. Etco2 monitor in place; etco2 is 42, RR 19; MD Archuleta notified.

## 2024-08-17 NOTE — PROGRESS NOTES
50.1 kg  96.8 F  Lethargic but arousable, states name, city and Month.   Narcan drip in place  HR 82  BP 98/55  2 liters NC  99%  End tidal 36  Multiple areas of skin redness/scabs, see photos.     4 Eyes Skin Assessment Completed by Savi RN and MIRELLA Seymour.    Head WDL  Ears WDL  Nose WDL  Mouth Discoloration  Neck Redness  Breast/Chest Redness  Shoulder Blades Redness  Spine Redness  (R) Arm/Elbow/Hand Redness  (L) Arm/Elbow/Hand Redness and Blanching  Abdomen WDL  Groin WDL  Scrotum/Coccyx/Buttocks Redness and Non-Blanching  (R) Leg Redness and Abrasion  (L) Leg Redness and Abrasion  (R) Heel/Foot/Toe Scab  (L) Heel/Foot/Toe Scab          Devices In Places ECG, Tele Box, Blood Pressure Cuff, Pulse Ox, and SCD's      Interventions In Place Gray Ear Foams, Sacral Mepilex, TAP System, Pillows, and Q2 Turns    Possible Skin Injury Yes    Pictures Uploaded Into Epic Yes  Wound Consult Placed Yes  RN Wound Prevention Protocol Ordered Yes

## 2024-08-17 NOTE — CONSULTS
"Critical Care Consultation    Date of consult: 8/16/2024    Referring Physician  Mickey Archuleta M.D.    Reason for Consultation  Opiate intoxication    History of Presenting Illness  28 y.o. male who was at the [facility] and was witnessed by staff to \"smoke\" fentanyl, he then presented 8/16/2024 with altered mental status and hypoventilation. EMS gave Narcan IN a total of 8 mg and in the ED he received anther 8mg IV Narcan for transient recurrence of apneic episodes. Every time he awakes he becomes violent and required hard restraints. Critical care consultation has been requested for further evaluation and management.    Code Status  Full Code    Review of Systems  Review of Systems   Unable to perform ROS: Medical condition       Past Medical History   has no past medical history on file.    Surgical History   has a past surgical history that includes other and irrigation & debridement general (Left, 6/11/2024).    Family History  family history is not on file.    Social History   reports that he has been smoking cigarettes. He has never used smokeless tobacco. He reports current alcohol use. He reports that he does not currently use drugs.    Medications  Home Medications       Reviewed by Thomas Tanner (Pharmacy Tech) on 08/16/24 at 1613  Med List Status: Unable to Obtain     Medication Last Dose Status        Patient Baudilio Taking any Medications                         Audit from Redirected Encounters    **Home medications have not yet been reviewed for this encounter**       Current Facility-Administered Medications   Medication Dose Route Frequency Provider Last Rate Last Admin    naloxone HCl (Narcan) injection 2 mg  2 mg Intravenous Q15 MIN PRN Jerald Lara M.D.   2 mg at 08/16/24 1542    enoxaparin (Lovenox) inj 40 mg  40 mg Subcutaneous DAILY AT 1800 Mickey Archuleta M.D.   40 mg at 08/16/24 1829    naloxone HCl (Narcan) 20 mg in  mL infusion  2 mg/hr Intravenous Continuous Mickey YANCEY" BRYN Archuleta 50 mL/hr at 08/16/24 1800 2 mg/hr at 08/16/24 1800       Allergies  No Known Allergies    Vital Signs last 24 hours  Pulse:  [] 80  Resp:  [15-18] 16  BP: ()/(52-65) 96/52  SpO2:  [92 %-100 %] 99 %    Physical Exam  Physical Exam  Constitutional:       Comments: Thin unconscious man supine in ED bed.   HENT:      Head: Normocephalic and atraumatic.      Mouth/Throat:      Mouth: Mucous membranes are moist.   Eyes:      Extraocular Movements: Extraocular movements intact.      Pupils: Pupils are equal, round, and reactive to light.   Cardiovascular:      Rate and Rhythm: Regular rhythm. Tachycardia present.      Heart sounds: No murmur heard.     No friction rub. No gallop.   Abdominal:      General: Abdomen is flat. There is no distension.      Palpations: Abdomen is soft.      Tenderness: There is no abdominal tenderness. There is no guarding or rebound.   Musculoskeletal:      Cervical back: Neck supple.      Right lower leg: No edema.      Left lower leg: No edema.   Skin:     General: Skin is warm and dry.      Comments: pale   Neurological:      Comments: Unresponsive to verbal stimuli or sternal rub. He opened his eyes and localized to a Yankauer catheter in his posterior pharynx.   Psychiatric:      Comments: KRISTA         Fluids    Intake/Output Summary (Last 24 hours) at 8/16/2024 2009  Last data filed at 8/16/2024 1800  Gross per 24 hour   Intake 23.13 ml   Output --   Net 23.13 ml       Laboratory  Recent Results (from the past 48 hour(s))   CBC WITH DIFFERENTIAL    Collection Time: 08/16/24  3:23 PM   Result Value Ref Range    WBC 12.2 (H) 4.8 - 10.8 K/uL    RBC 4.72 4.70 - 6.10 M/uL    Hemoglobin 15.0 14.0 - 18.0 g/dL    Hematocrit 42.8 42.0 - 52.0 %    MCV 90.7 81.4 - 97.8 fL    MCH 31.8 27.0 - 33.0 pg    MCHC 35.0 32.3 - 36.5 g/dL    RDW 48.0 35.9 - 50.0 fL    Platelet Count 221 164 - 446 K/uL    MPV 10.2 9.0 - 12.9 fL    Neutrophils-Polys 82.10 (H) 44.00 - 72.00 %    Lymphocytes  9.50 (L) 22.00 - 41.00 %    Monocytes 7.80 0.00 - 13.40 %    Eosinophils 0.00 0.00 - 6.90 %    Basophils 0.30 0.00 - 1.80 %    Immature Granulocytes 0.30 0.00 - 0.90 %    Nucleated RBC 0.00 0.00 - 0.20 /100 WBC    Neutrophils (Absolute) 9.97 (H) 1.82 - 7.42 K/uL    Lymphs (Absolute) 1.15 1.00 - 4.80 K/uL    Monos (Absolute) 0.95 (H) 0.00 - 0.85 K/uL    Eos (Absolute) 0.00 0.00 - 0.51 K/uL    Baso (Absolute) 0.04 0.00 - 0.12 K/uL    Immature Granulocytes (abs) 0.04 0.00 - 0.11 K/uL    NRBC (Absolute) 0.00 K/uL   CMP    Collection Time: 24  3:23 PM   Result Value Ref Range    Sodium 143 135 - 145 mmol/L    Potassium 3.2 (L) 3.6 - 5.5 mmol/L    Chloride 108 96 - 112 mmol/L    Co2 17 (L) 20 - 33 mmol/L    Anion Gap 18.0 (H) 7.0 - 16.0    Glucose 165 (H) 65 - 99 mg/dL    Bun 13 8 - 22 mg/dL    Creatinine 0.78 0.50 - 1.40 mg/dL    Calcium 8.3 (L) 8.5 - 10.5 mg/dL    Correct Calcium 8.4 (L) 8.5 - 10.5 mg/dL    AST(SGOT) 32 12 - 45 U/L    ALT(SGPT) 17 2 - 50 U/L    Alkaline Phosphatase 92 30 - 99 U/L    Total Bilirubin 0.2 0.1 - 1.5 mg/dL    Albumin 3.9 3.2 - 4.9 g/dL    Total Protein 6.8 6.0 - 8.2 g/dL    Globulin 2.9 1.9 - 3.5 g/dL    A-G Ratio 1.3 g/dL   ESTIMATED GFR    Collection Time: 24  3:23 PM   Result Value Ref Range    GFR (CKD-EPI) 125 >60 mL/min/1.73 m 2   EKG    Collection Time: 24  4:37 PM   Result Value Ref Range    Report       [facility] Emergency Dept.    Test Date:  2024  Pt Name:    FREDDIE KATZ                 Department: ER  MRN:        [id number]                      Room:       [facility]  Gender:     Male                         Technician: [id number]  :        1995                   Requested By:VINAY HARDWICK  Order #:    [id number]                    Reading MD:    Measurements  Intervals                                Axis  Rate:       108                          P:          88  SD:         133                          QRS:        96  QRSD:       88                            T:          76  QT:         354  QTc:        475    Interpretive Statements  Sinus tachycardia  Biatrial enlargement  Borderline right axis deviation  Abnormal lateral Q waves  Borderline ST elevation, anterior leads  No previous ECG available for comparison         Imaging  DX-CHEST-PORTABLE (1 VIEW)   Final Result      No acute cardiac or pulmonary abnormalities are identified.          Assessment/Plan  * Acute respiratory failure (HCC)- (present on admission)  Assessment & Plan  Due to opiate intoxication  End tidal CO2 detector monitoring  Titrate Narcan infusion to maintain goal RR>12 and RASS>-2    Opiate abuse, episodic (HCC)  Assessment & Plan  Cessation counseling prior to discharge    Alcohol dependence (HCC)- (present on admission)  Assessment & Plan  Monitor and tx for s/s of alcohol withdrawal.        Discussed patient condition and risk of morbidity and/or mortality with RN, RT, Pharmacy, and ERP .      The patient remains critically ill,  I have assessed and reassessed the blood pressure,  cardiovascular status, labs and response to interventions. This patient remains at high risk for worsening shock and death without the above critical care interventions.    Critical care time = 80 minutes in directly providing and coordinating critical care and extensive data review.  No time overlap and excludes procedures.

## 2024-08-17 NOTE — PROGRESS NOTES
2100 Dr. Archuleta to bedside, pt agreeable to staying until after he eats. Gave patient two lean cuisine meals.     2119 Pt ate food and attempting to leave again, security called. Dr. Archuleta at bedside to speak with patient again.  Okay for pt to be off the monitor at this time.     2300 Dr. Archuleta rounded on pt, pt sleeping. Okay for Q4h vitals and he is aware pt has no IV's at this time.

## 2024-08-17 NOTE — ED NOTES
Pt bed alarm went off. Pt sitting on edge of bed stating he needs to void. Pt assisted in using urinal, pt responding to questions. Pt back in [facility], monitoring in place, bed alarm in place.

## 2024-08-17 NOTE — PROGRESS NOTES
Critical care update:    Approximately 2030 I was notified by the bedside RN that Mr. Levi woke up while on his Narcan infusion and demanded to be released AGAINST MEDICAL ADVICE.    His Narcan infusion was turned of at 2015.    I came to the bedside to determine if Mr. Levi had decisional capacity.  I explained to him that he was found unconscious and unresponsive and had stopped breathing several times and had to be rescued with Narcan as an antidote for narcotics.  I explained that if he left before he was medically cleared that he could stop breathing and die.      He could not articulate in his own words why he was in the hospital when he was being treated for or the implications of being released before he was medically clear.      He currently does not have decision-making capacity.    I offered him a meal tray and told him that we would continue to reassess and he would be released as soon as he is medically cleared.    Mickey Archuleta MD  Critical care medicine

## 2024-08-17 NOTE — CARE PLAN
The patient is Watcher - Medium risk of patient condition declining or worsening    Shift Goals  Clinical Goals: Titrate narcan gtt off  Patient Goals: leave  Family Goals: no family present      Problem: Safety - Violent/Self-destructive Restraint  Goal: Remains free of injury from restraints (Restraint for Violent/Self-Destructive Behavior)  Outcome: Met  Goal: Free from restraints (Restraint for Violent or Self-Destructive Behavior)  Outcome: Met     Problem: Safety - Medical Restraint  Goal: Remains free of injury from restraints (Restraint for Interference with Medical Device)  Outcome: Met  Goal: Free from restraint(s) (Restraint for Interference with Medical Device)  Outcome: Met     Problem: Fall Risk  Goal: Patient will remain free from falls  Outcome: Progressing

## 2024-08-18 ENCOUNTER — PHARMACY VISIT (OUTPATIENT)
Dept: PHARMACY | Facility: MEDICAL CENTER | Age: 29
End: 2024-08-18
Payer: COMMERCIAL

## 2024-08-18 ENCOUNTER — HOSPITAL ENCOUNTER (EMERGENCY)
Facility: MEDICAL CENTER | Age: 29
End: 2024-08-18
Attending: EMERGENCY MEDICINE
Payer: MEDICAID

## 2024-08-18 VITALS
HEART RATE: 97 BPM | RESPIRATION RATE: 18 BRPM | OXYGEN SATURATION: 97 % | BODY MASS INDEX: 19.13 KG/M2 | TEMPERATURE: 98.6 F | HEIGHT: 67 IN | DIASTOLIC BLOOD PRESSURE: 72 MMHG | WEIGHT: 121.91 LBS | SYSTOLIC BLOOD PRESSURE: 117 MMHG

## 2024-08-18 DIAGNOSIS — F10.930 ALCOHOL WITHDRAWAL SYNDROME WITHOUT COMPLICATION (HCC): ICD-10-CM

## 2024-08-18 PROCEDURE — RXMED WILLOW AMBULATORY MEDICATION CHARGE: Performed by: EMERGENCY MEDICINE

## 2024-08-18 PROCEDURE — 99284 EMERGENCY DEPT VISIT MOD MDM: CPT

## 2024-08-18 RX ORDER — CHLORDIAZEPOXIDE HYDROCHLORIDE 25 MG/1
CAPSULE, GELATIN COATED ORAL
Qty: 10 CAPSULE | Refills: 0 | Status: SHIPPED | OUTPATIENT
Start: 2024-08-18 | End: 2024-08-22

## 2024-08-18 ASSESSMENT — FIBROSIS 4 INDEX: FIB4 SCORE: 1.02

## 2024-08-18 NOTE — ED NOTES
Pt taking off leads & stating that he is leaving. Pt still has slurred speech & appears intoxicated. Breathalyzer 0.175. Informed pt that it is recommended he stay until he is more sober & can ambulate safely, as he does not have a safe ride home or person to take him home. Pt went back to sleep. On bed alarm.

## 2024-08-18 NOTE — ED NOTES
Pt sleeping on his stomach in [facility], connected to monitor with unlabored respirations.

## 2024-08-18 NOTE — ED NOTES
Pt ready for discharge, instructions given, verbalizes understanding  Pt escorted to [place] pharmacy to  prescribed medication

## 2024-08-18 NOTE — ED TRIAGE NOTES
"Chief Complaint   Patient presents with    ETOH Withdrawal     Pt BIB EMS from [facility] for alcohol withdrawal. Pt received 2mg versed en route.          /78   Pulse 96   Temp 37 °C (98.6 °F) (Temporal)   Ht 1.702 m (5' 7.01\")   Wt 55.3 kg (121 lb 14.6 oz)   SpO2 94%   BMI 19.09 kg/m²     "

## 2024-08-18 NOTE — ED NOTES
Pt ambulatory to restroom with steady gait, pt is alert and oriented x 4 and verbalizes readiness for DC. Dr. Lara updated.

## 2024-08-18 NOTE — DISCHARGE SUMMARY
ED Observation Discharge Summary    Patient:Sivakumar Levi  Patient : 1995  Patient MRN: [id number]  Patient PCP: Pcp Pt States None    Admit Date: 2024  Discharge Date and Time: 24 7:43 PM  Discharge Diagnosis: Toxic encephalopathy, alcohol intoxication  Discharge Attending: Jerald Lara M.D.  Discharge Service: ED Observation    ED Course  Sivakumar is a 28 y.o. male who was evaluated at [facility] with toxic encephalopathy and alcohol intoxication    2:27 PM - Patient presents to the ED with alcohol intoxication onset prior to arrival. Per protocol, CBC w/ dif, POCT glucose docked device, CMP, Diagnostic alcohol and urine drug screen were ordered. Patient will be treated with Pepcid 20 mg. Ordered for CT-head without and EKG to evaluate his symptoms specifically altered mentation disproportionate to alcohol level.    Patient began to try to leave the emergency department and is confused encephalopathic state  Multiple verbal attempts at de-escalation were had which were unsuccessful and patient required chemical sedation and close cardiac monitoring    CRITICAL CARE  The very real possibilty of a deterioration of this patient's condition required the highest level of my preparedness for sudden, emergent intervention.  I provided critical care services, which included medication orders, frequent reevaluations of the patient's condition and response to treatment, ordering and reviewing test results.    Given profound level of intoxication and encephalopathy patient unable to follow commands for his own safety and began to try to walk which may have been fatal resulting in walking into traffic or close head injury.  Patient was unable to speak his name or follow commands and given haloperidol at this time to help facilitate patient's safe transition to sobriety.  At which time patient placed on cardiac monitor along with respiratory support and oxygen.  Frequent reassessments were required given  "patient's level of intoxication to ensure he did not end up like numerous other intoxicated people who vomit and choke on their vomit and die.    The critical care time associated with the care of the patient was 35 minutes. Review chart for interventions. This time is exclusive of any other billable procedures.      4:24 PM - Patient was reevaluated at bedside. Patient is alert and the monitor is on.     8:07 PM - I reevaluated the patient at bedside. I discussed the patient's diagnostic study results at this time. I discussed plan for discharge and follow up as outlined below. The patient is stable for discharge at this time and will return for any new or worsening symptoms. Patient verbalizes understanding and support with my plan for discharge.       Discharge Exam:  /62   Pulse 93   Temp 36.9 °C (98.4 °F)   Resp 15   Ht 1.702 m (5' 7\")   Wt 55.3 kg (122 lb)   SpO2 93%   BMI 19.11 kg/m² .    Constitutional: Awake and alert. Nontoxic  HENT:  Grossly normal  Eyes: Grossly normal  Neck: Normal range of motion  Cardiovascular: Normal heart rate   Thorax & Lungs: No respiratory distress  Abdomen: Nontender  Skin:  No pathologic rash.   Extremities: Well perfused  Psychiatric: Affect normal, no SI no HI  Neuro: Slurred speech moves all 4 extremities pupils 3 mm and reactive.    Labs  Results for orders placed or performed during the hospital encounter of 08/17/24   Comp Metabolic Panel   Result Value Ref Range    Sodium 140 135 - 145 mmol/L    Potassium 3.5 (L) 3.6 - 5.5 mmol/L    Chloride 105 96 - 112 mmol/L    Co2 19 (L) 20 - 33 mmol/L    Anion Gap 16.0 7.0 - 16.0    Glucose 234 (H) 65 - 99 mg/dL    Bun 11 8 - 22 mg/dL    Creatinine 0.55 0.50 - 1.40 mg/dL    Calcium 8.3 (L) 8.5 - 10.5 mg/dL    Correct Calcium 8.5 8.5 - 10.5 mg/dL    AST(SGOT) 28 12 - 45 U/L    ALT(SGPT) 15 2 - 50 U/L    Alkaline Phosphatase 90 30 - 99 U/L    Total Bilirubin <0.2 0.1 - 1.5 mg/dL    Albumin 3.7 3.2 - 4.9 g/dL    Total " Protein 5.1 (L) 6.0 - 8.2 g/dL    Globulin 1.4 (L) 1.9 - 3.5 g/dL    A-G Ratio 2.6 g/dL   Diagnostic Alcohol   Result Value Ref Range    Diagnostic Alcohol 414.0 (HH) <10.1 mg/dL   Urine Drug Screen (Triage)   Result Value Ref Range    Amphetamines Urine Negative Negative    Barbiturates Negative Negative    Benzodiazepines Negative Negative    Cocaine Metabolite Negative Negative    Fentanyl, Urine Negative Negative    Methadone Negative Negative    Opiates Negative Negative    Oxycodone Negative Negative    Phencyclidine -Pcp Negative Negative    Propoxyphene Negative Negative    Cannabinoid Metab Negative Negative   CBC with Differential   Result Value Ref Range    WBC 10.4 4.8 - 10.8 K/uL    RBC 4.30 (L) 4.70 - 6.10 M/uL    Hemoglobin 13.7 (L) 14.0 - 18.0 g/dL    Hematocrit 39.7 (L) 42.0 - 52.0 %    MCV 92.3 81.4 - 97.8 fL    MCH 31.9 27.0 - 33.0 pg    MCHC 34.5 32.3 - 36.5 g/dL    RDW 48.9 35.9 - 50.0 fL    Platelet Count 191 164 - 446 K/uL    MPV 10.4 9.0 - 12.9 fL    Neutrophils-Polys 74.80 (H) 44.00 - 72.00 %    Lymphocytes 16.30 (L) 22.00 - 41.00 %    Monocytes 7.90 0.00 - 13.40 %    Eosinophils 0.30 0.00 - 6.90 %    Basophils 0.40 0.00 - 1.80 %    Immature Granulocytes 0.30 0.00 - 0.90 %    Nucleated RBC 0.00 0.00 - 0.20 /100 WBC    Neutrophils (Absolute) 7.74 (H) 1.82 - 7.42 K/uL    Lymphs (Absolute) 1.69 1.00 - 4.80 K/uL    Monos (Absolute) 0.82 0.00 - 0.85 K/uL    Eos (Absolute) 0.03 0.00 - 0.51 K/uL    Baso (Absolute) 0.04 0.00 - 0.12 K/uL    Immature Granulocytes (abs) 0.03 0.00 - 0.11 K/uL    NRBC (Absolute) 0.00 K/uL   ESTIMATED GFR   Result Value Ref Range    GFR (CKD-EPI) 139 >60 mL/min/1.73 m 2   EKG   Result Value Ref Range    Report       [facility] Emergency Dept.    Test Date:  2024  Pt Name:    FREDDIEEUGENIO KATZ                 Department: ER  MRN:        [id number]                      Room:        07  Gender:     Male                         Technician: [id number]  :         1995                   Requested By:ER TRIAGE PROTOCOL  Order #:    [id number]                    Reading MD: STEFANIE BENNETT MD    Measurements  Intervals                                Axis  Rate:       99                           P:          72  RI:         142                          QRS:        95  QRSD:       98                           T:          44  QT:         369  QTc:        474    Interpretive Statements  Sinus rhythm  Probable left atrial enlargement  Borderline right axis deviation  Borderline prolonged QT interval  Compared to ECG 2024 16:37:48  Sinus tachycardia no longer present  Q waves no longer present  ST (T wave) deviation no longer present  Electronically Signed On 2024 12:15:24 PDT b y STEFANIE BENNETT MD     EKG   Result Value Ref Range    Report       [facility] Emergency Dept.    Test Date:  2024  Pt Name:    [facility]                 Department: ER  MRN:        [id number]                      Room:       [facility]  Gender:     Male                         Technician: [id number]  :        1995                   Requested By:VINAY HARDWICK  Order #:    [id number]                    Reading MD:    Measurements  Intervals                                Axis  Rate:       89                           P:          91  RI:         139                          QRS:        87  QRSD:       79                           T:          67  QT:         366  QTc:        446    Interpretive Statements  Sinus rhythm  Consider right atrial enlargement  ST elev, probable normal early repol pattern  Artifact in lead(s) I,II,aVR,aVL,aVF,V1,V3,V4,V5,V6  Compared to ECG 2024 10:58:49  ST (T wave) deviation now present         Radiology  CT-HEAD W/O   Final Result         1. No acute intracranial abnormality. No evidence of acute intracranial hemorrhage or mass lesion.                               Medications:   New Prescriptions    No medications on file              My final assessment includes counseling regarding alcohol cessation      Pt afebrile at this time, doubt infectious etiology of encephalopathy given resolution prior to d/c  Pt counseled on cessation and use in moderation  Pt ambulated w/ steady gait w/o assistance and is tolerating PO prior to DC   Upon Reevaluation, the patient's condition has: Improved; and will be discharged.    Patient discharged from ED Observation status at 8:05p (Time) 08/17 (Date).     Total time spent on this ED Observation discharge encounter is < 30 Minutes    Electronically signed by: Jerald Lara M.D., 8/17/2024 7:43 PM

## 2024-08-18 NOTE — ED NOTES
Bedside report received from off going RN Misa, assumed care of patient. Pt resting comfortably at this time. Call light within reach, all needs addressed at this time.       Fall risk interventions in place: Move the patient closer to the nurse's station, Patient's personal possessions are with in their safe reach, Place socks on patient, Place fall risk sign on patient's door, Give patient urinal if applicable, Keep floor surfaces clean and dry, Accompanied to restroom, and Bed Alarm in use (all applicable per [place] Fall risk assessment)   Continuous monitoring: Cardiac Leads, Pulse Ox, or Blood Pressure  IVF/IV medications: Not Applicable   Oxygen: Room Air  Bedside sitter: Not Applicable   Isolation: Not Applicable

## 2024-08-18 NOTE — ED NOTES
Pt resting on bed in nad, connected to monitors. VSS, even chest rise and fall noted. Pt on bed alarm per safety precautions.

## 2024-08-18 NOTE — ED PROVIDER NOTES
ED Provider Note    CHIEF COMPLAINT  Chief Complaint   Patient presents with    ETOH Withdrawal     Pt BIB EMS from [facility] for alcohol withdrawal. Pt received 2mg versed en route.        EXTERNAL RECORDS REVIEWED  Inpatient Notes recent admission to the intensive care unit for 13 hours secondary to smoking fentanyl and overdosing    HPI/ROS  LIMITATION TO HISTORY   Select: : None  EMS gives all initial history that he received 2 mg of Versed prior to arrival secondary to combativeness and thought that he might be in withdrawal    Sivakumar Levi is a 28 y.o. male who presents after being seen just yesterday by Dr. Lara for alcohol intoxication and toxic encephalopathy.  This patient has come to the emergency department multiple times this past month and has a known extensive visit history.  He states that he relocated from [place] in order to start a detox program but the [facility] program is backed up by 6 months.  He would like to get sober but does not think he can do it on his own.  He denies any coingestions except for tobacco and says that he has been smoker for years.  Recent hospitalization after fentanyl overdose    PAST MEDICAL HISTORY       SURGICAL HISTORY   has a past surgical history that includes other and irrigation & debridement general (Left, 6/11/2024).    FAMILY HISTORY  History reviewed. No pertinent family history.    SOCIAL HISTORY  Social History     Tobacco Use    Smoking status: Every Day     Types: Cigarettes    Smokeless tobacco: Never   Vaping Use    Vaping status: Some Days   Substance and Sexual Activity    Alcohol use: Yes     Comment: None since 12/17/23; previous hx of ETOH abuse    Drug use: Not Currently    Sexual activity: Not on file       CURRENT MEDICATIONS  Home Medications    **Home medications have not yet been reviewed for this encounter**         ALLERGIES  No Known Allergies    PHYSICAL EXAM  VITAL SIGNS: /72   Pulse 97   Temp 37 °C (98.6 °F)  "(Temporal)   Resp 18   Ht 1.702 m (5' 7.01\")   Wt 55.3 kg (121 lb 14.6 oz)   SpO2 97%   BMI 19.09 kg/m²    Constitutional: Thin and somnolent male, No acute distress, Non-toxic appearance.   HENT: Normocephalic, Atraumatic, Bilateral external ears normal, Oropharynx is clear mucous membranes are moist. No oral exudates or nasal discharge.   Eyes: Pupils are equal round and reactive, EOMI, Conjunctiva normal, No discharge.   Neck: Normal range of motion, No tenderness, Supple, No stridor. No meningismus.  Lymphatic: No lymphadenopathy noted.   Cardiovascular: Regular rate and rhythm without murmur rub or gallop.  Thorax & Lungs: Clear breath sounds bilaterally without wheezes, rhonchi or rales. There is no chest wall tenderness.   Abdomen: Soft non-tender non-distended. There is no rebound or guarding. No organomegaly is appreciated. Bowel sounds are normal.  Skin: Normal without rash.   Back: No CVA or spinal tenderness.   Extremities: Intact distal pulses, No edema, No tenderness, No cyanosis, No clubbing. Capillary refill is less than 2 seconds.  Musculoskeletal: Good range of motion in all major joints. No tenderness to palpation or major deformities noted.   Neurologic: Alert & oriented x 3, Normal motor function, Normal sensory function, No focal deficits noted. Reflexes are normal.  Psychiatric: Affect normal, Judgment normal, Mood normal. There is no suicidal ideation or patient reported hallucinations.       COURSE & MEDICAL DECISION MAKING    ASSESSMENT, COURSE AND PLAN  Care Narrative: This is a homeless male who frequents the emergency department for polysubstance abuse issues and alcohol intoxication and presents after being given 2 mg of Versed by EMS prior to arrival with possible alcohol withdrawal.  No Narcan was administered and it does not seem that an opiate coingestants was involved today.    The patient did not have any indication for imaging or blood work this time.  I think he would " benefit from Librium taper and I spent a fair bit of time talking about this after he was coherent enough to have the conversation.    Patient is amenable to picking up Librium at the [facility] and starting it today immediately upon getting it and trying to avoid going into withdrawal later.  He understands that coingestions should be avoided as well as alcohol for safety reasons.  He is discharged in stable condition understands plan of care    DISPOSITION AND DISCUSSIONS    Escalation of care considered, and ultimately not performed:Laboratory analysis consider doing lab work but recent lab work was done and he comes in after 2 mg of Versed by EMS in stable condition with normal vital signs and I do not think he is in active withdrawal at this time but probably will become active later on this afternoon if not medicated    Barriers to care at this time, including but not limited to: Patient does not have established PCP, Patient is homeless, Patient lacks transportation , and Patient does not have insurance.     Decision tools and prescription drugs considered including, but not limited to:  I am prescribing him a Librium taper which she is amenable to doing over the next 4 days and agrees to not taking alcohol along with this medication given its risks .    FINAL DIAGNOSIS  1. Alcohol withdrawal syndrome without complication (HCC)         Electronically signed by: Baron Juarez M.D., 8/18/2024 9:06 AM

## 2024-08-18 NOTE — DISCHARGE INSTRUCTIONS
Please go to [facility] and  your medications and start them immediately.  Avoid alcohol with these medications as the 2 in combination are quite dangerous

## 2024-08-18 NOTE — DISCHARGE INSTRUCTIONS
Please discuss the following findings with your regular doctor:  CT-HEAD W/O   Final Result         1. No acute intracranial abnormality. No evidence of acute intracranial hemorrhage or mass lesion.                             Labs Reviewed   COMP METABOLIC PANEL - Abnormal; Notable for the following components:       Result Value    Potassium 3.5 (*)     Co2 19 (*)     Glucose 234 (*)     Calcium 8.3 (*)     Total Protein 5.1 (*)     Globulin 1.4 (*)     All other components within normal limits   DIAGNOSTIC ALCOHOL - Abnormal; Notable for the following components:    Diagnostic Alcohol 414.0 (*)     All other components within normal limits   CBC WITH DIFFERENTIAL - Abnormal; Notable for the following components:    RBC 4.30 (*)     Hemoglobin 13.7 (*)     Hematocrit 39.7 (*)     Neutrophils-Polys 74.80 (*)     Lymphocytes 16.30 (*)     Neutrophils (Absolute) 7.74 (*)     All other components within normal limits   URINE DRUG SCREEN   ESTIMATED GFR   POCT GLUCOSE

## 2024-08-24 ENCOUNTER — HOSPITAL ENCOUNTER (EMERGENCY)
Facility: MEDICAL CENTER | Age: 29
End: 2024-08-24
Attending: EMERGENCY MEDICINE
Payer: MEDICAID

## 2024-08-24 VITALS
SYSTOLIC BLOOD PRESSURE: 110 MMHG | RESPIRATION RATE: 16 BRPM | OXYGEN SATURATION: 94 % | BODY MASS INDEX: 18.83 KG/M2 | DIASTOLIC BLOOD PRESSURE: 76 MMHG | TEMPERATURE: 97 F | WEIGHT: 120 LBS | HEART RATE: 91 BPM | HEIGHT: 67 IN

## 2024-08-24 DIAGNOSIS — F10.920 ALCOHOLIC INTOXICATION WITHOUT COMPLICATION (HCC): ICD-10-CM

## 2024-08-24 PROCEDURE — 99284 EMERGENCY DEPT VISIT MOD MDM: CPT

## 2024-08-24 ASSESSMENT — FIBROSIS 4 INDEX: FIB4 SCORE: 1.02

## 2024-08-25 ENCOUNTER — APPOINTMENT (OUTPATIENT)
Dept: RADIOLOGY | Facility: MEDICAL CENTER | Age: 29
End: 2024-08-25
Attending: EMERGENCY MEDICINE
Payer: MEDICAID

## 2024-08-25 ENCOUNTER — HOSPITAL ENCOUNTER (EMERGENCY)
Facility: MEDICAL CENTER | Age: 29
End: 2024-08-25
Attending: EMERGENCY MEDICINE
Payer: MEDICAID

## 2024-08-25 VITALS
SYSTOLIC BLOOD PRESSURE: 104 MMHG | RESPIRATION RATE: 17 BRPM | BODY MASS INDEX: 21.03 KG/M2 | HEIGHT: 69 IN | WEIGHT: 142 LBS | TEMPERATURE: 97.5 F | HEART RATE: 85 BPM | DIASTOLIC BLOOD PRESSURE: 58 MMHG | OXYGEN SATURATION: 97 %

## 2024-08-25 DIAGNOSIS — F10.921 ACUTE ALCOHOLIC INTOXICATION WITH DELIRIUM (HCC): Primary | ICD-10-CM

## 2024-08-25 LAB
ALBUMIN SERPL BCP-MCNC: 3.8 G/DL (ref 3.2–4.9)
ALBUMIN/GLOB SERPL: 1.2 G/DL
ALP SERPL-CCNC: 133 U/L (ref 30–99)
ALT SERPL-CCNC: 37 U/L (ref 2–50)
ANION GAP SERPL CALC-SCNC: 13 MMOL/L (ref 7–16)
AST SERPL-CCNC: 56 U/L (ref 12–45)
BASOPHILS # BLD AUTO: 0.4 % (ref 0–1.8)
BASOPHILS # BLD: 0.05 K/UL (ref 0–0.12)
BILIRUB SERPL-MCNC: <0.2 MG/DL (ref 0.1–1.5)
BUN SERPL-MCNC: 10 MG/DL (ref 8–22)
CALCIUM ALBUM COR SERPL-MCNC: 9 MG/DL (ref 8.5–10.5)
CALCIUM SERPL-MCNC: 8.8 MG/DL (ref 8.5–10.5)
CHLORIDE SERPL-SCNC: 108 MMOL/L (ref 96–112)
CO2 SERPL-SCNC: 26 MMOL/L (ref 20–33)
CREAT SERPL-MCNC: 0.75 MG/DL (ref 0.5–1.4)
EOSINOPHIL # BLD AUTO: 0.08 K/UL (ref 0–0.51)
EOSINOPHIL NFR BLD: 0.7 % (ref 0–6.9)
ERYTHROCYTE [DISTWIDTH] IN BLOOD BY AUTOMATED COUNT: 51.9 FL (ref 35.9–50)
ETHANOL BLD-MCNC: 436.9 MG/DL
GFR SERPLBLD CREATININE-BSD FMLA CKD-EPI: 126 ML/MIN/1.73 M 2
GLOBULIN SER CALC-MCNC: 3.3 G/DL (ref 1.9–3.5)
GLUCOSE BLD STRIP.AUTO-MCNC: 126 MG/DL (ref 65–99)
GLUCOSE SERPL-MCNC: 86 MG/DL (ref 65–99)
HCT VFR BLD AUTO: 46.7 % (ref 42–52)
HGB BLD-MCNC: 15.7 G/DL (ref 14–18)
IMM GRANULOCYTES # BLD AUTO: 0.04 K/UL (ref 0–0.11)
IMM GRANULOCYTES NFR BLD AUTO: 0.3 % (ref 0–0.9)
LYMPHOCYTES # BLD AUTO: 2.67 K/UL (ref 1–4.8)
LYMPHOCYTES NFR BLD: 21.9 % (ref 22–41)
MCH RBC QN AUTO: 31.8 PG (ref 27–33)
MCHC RBC AUTO-ENTMCNC: 33.6 G/DL (ref 32.3–36.5)
MCV RBC AUTO: 94.5 FL (ref 81.4–97.8)
MONOCYTES # BLD AUTO: 0.69 K/UL (ref 0–0.85)
MONOCYTES NFR BLD AUTO: 5.7 % (ref 0–13.4)
NEUTROPHILS # BLD AUTO: 8.66 K/UL (ref 1.82–7.42)
NEUTROPHILS NFR BLD: 71 % (ref 44–72)
NRBC # BLD AUTO: 0 K/UL
NRBC BLD-RTO: 0 /100 WBC (ref 0–0.2)
PLATELET # BLD AUTO: 320 K/UL (ref 164–446)
PMV BLD AUTO: 9 FL (ref 9–12.9)
POTASSIUM SERPL-SCNC: 3.7 MMOL/L (ref 3.6–5.5)
PROT SERPL-MCNC: 7.1 G/DL (ref 6–8.2)
RBC # BLD AUTO: 4.94 M/UL (ref 4.7–6.1)
SODIUM SERPL-SCNC: 147 MMOL/L (ref 135–145)
WBC # BLD AUTO: 12.2 K/UL (ref 4.8–10.8)

## 2024-08-25 PROCEDURE — 82962 GLUCOSE BLOOD TEST: CPT

## 2024-08-25 PROCEDURE — 99285 EMERGENCY DEPT VISIT HI MDM: CPT

## 2024-08-25 PROCEDURE — 700105 HCHG RX REV CODE 258: Mod: UD | Performed by: EMERGENCY MEDICINE

## 2024-08-25 PROCEDURE — 82077 ASSAY SPEC XCP UR&BREATH IA: CPT

## 2024-08-25 PROCEDURE — 85025 COMPLETE CBC W/AUTO DIFF WBC: CPT

## 2024-08-25 PROCEDURE — 70450 CT HEAD/BRAIN W/O DYE: CPT

## 2024-08-25 PROCEDURE — 36415 COLL VENOUS BLD VENIPUNCTURE: CPT

## 2024-08-25 PROCEDURE — 80053 COMPREHEN METABOLIC PANEL: CPT

## 2024-08-25 RX ORDER — SODIUM CHLORIDE, SODIUM LACTATE, POTASSIUM CHLORIDE, CALCIUM CHLORIDE 600; 310; 30; 20 MG/100ML; MG/100ML; MG/100ML; MG/100ML
1000 INJECTION, SOLUTION INTRAVENOUS ONCE
Status: COMPLETED | OUTPATIENT
Start: 2024-08-25 | End: 2024-08-25

## 2024-08-25 RX ADMIN — SODIUM CHLORIDE, POTASSIUM CHLORIDE, SODIUM LACTATE AND CALCIUM CHLORIDE 1000 ML: 600; 310; 30; 20 INJECTION, SOLUTION INTRAVENOUS at 15:30

## 2024-08-25 ASSESSMENT — FIBROSIS 4 INDEX: FIB4 SCORE: 1.02

## 2024-08-25 NOTE — ED NOTES
Pt discovered out of bed. Linens changed due to urine saturation. Pt returned to bed. Bed alarm in place.

## 2024-08-25 NOTE — ED PROVIDER NOTES
ED Provider Note    CHIEF COMPLAINT  Chief Complaint   Patient presents with    ALOC    Alcohol Intoxication     BIBA after being found lying on the sidewalk with a GCS of 12, no signs of trauma, suspected ETOH. Pt is known to the department for recent fentanyl OD. Pupils PERRL, moving all 4, pt responsive to physical stimulation, incomprehensible speech.        EXTERNAL RECORDS REVIEWED  Was seen in our emergency department on 8/18 for alcohol withdrawal was admitted to the hospital on 8/16 for fentanyl overdose    HPI/ROS  LIMITATION TO HISTORY   Select: Intoxication  OUTSIDE HISTORIAN(S):  EMS patient was found lying on the sidewalk there were no signs of trauma glucose was within normal limits prior to arrival with EMS    Sivakumar Levi is a 28 y.o. male who presents to the emergency department likely with intoxication.  His pupils are wide he will awaken easily to mild verbal stimuli.  The only thing he said to me was leave alone and rolled over.  There are no signs of trauma on the patient his vital signs are unremarkable and his glucose was normal prior to arrival.    PAST MEDICAL HISTORY       SURGICAL HISTORY   has a past surgical history that includes other and irrigation & debridement general (Left, 6/11/2024).    FAMILY HISTORY  History reviewed. No pertinent family history.    SOCIAL HISTORY  Social History     Tobacco Use    Smoking status: Every Day     Types: Cigarettes    Smokeless tobacco: Never   Vaping Use    Vaping status: Some Days   Substance and Sexual Activity    Alcohol use: Yes     Comment: None since 12/17/23; previous hx of ETOH abuse    Drug use: Yes     Comment: Fentanyl    Sexual activity: Not on file       CURRENT MEDICATIONS  Home Medications       Reviewed by Maryjane Gonzalez R.N. (Registered Nurse) on 08/24/24 at 1701  Med List Status: Not Addressed     Medication Last Dose Status        Patient Baudilio Taking any Medications                           ALLERGIES  No Known  "Allergies    PHYSICAL EXAM  VITAL SIGNS: /63   Pulse 96   Temp 36.1 °C (97 °F) (Temporal)   Resp 12   Ht 1.702 m (5' 7\")   Wt 54.4 kg (120 lb)   SpO2 93%   BMI 18.79 kg/m²    Pulse OX: Pulse Oxygen level is within normal limits on room air  Constitutional: Asleep without distress, mildly disheveled  HENT: Normocephalic, Atraumatic, MMM  Eyes: PERound. Conjunctiva normal, non-icteric.   Heart: Regular rate and rhythm, intact distal pulses   Lungs: Symmetrical movement, no resp distress   Abdomen: Non-tender, non-distended, normal bowel sounds  EXT/Back no signs of trauma  Skin: Warm, Dry, No erythema, No rash.   Neurologic: sleepy Grossly non-focal.  moving all extremities seems to have good coordination while rolling over and pulling the blankets over his head        COURSE & MEDICAL DECISION MAKING    ASSESSMENT, COURSE AND PLAN  Care Narrative:     Patient is a 28-year-old male presents emerged department likely with alcohol intoxication leading to mild altered mental status.  Glucose was normal he is not hypoglycemic his vital signs are unremarkable is not in any respiratory distress there is no signs of trauma on my examination.  Glucose without concern for hypoglycemia or signs of trauma I likely suspect alcohol intoxication as he has a frequent use of alcohol and fentanyl but his pupils are dilated without concern for fentanyl overdose at this time.  He will be observed in the emergency department until he is more awake    DISPOSITION AND DISCUSSIONS  ED OBS: Yes; I am placing the patient in to an observation status due to a diagnostic uncertainty as well as therapeutic intensity. Patient placed in observation status at 9:53 PM, 8/24/2024.     Observation plan is as follows: metabolization     Upon Reevaluation, the patient's condition has: Improved; and will be discharged.    Patient discharged from ED Observation status at 11:49 PM  (Time) 8/24 (Date).       11:49 PM  Called to the bedside as " the patient was waking up and complaining of dental pain I went in there I did not see any signs of dental caries or dental abscess but the patient started becoming aggressive with myself and staff he was ambulatory on his own expresses a desire to leave he was alert and oriented and was discharged from the emergency department      I have discussed management of the patient with the following physicians and ELLIS's:  none    Discussion of management with other QHP or appropriate source(s): None     Escalation of care considered, and ultimately not performed:Laboratory analysis and diagnostic imaging    Barriers to care at this time, including but not limited to: Patient does not have established PCP.     Decision tools and prescription drugs considered including, but not limited to:  na .    The patient will return for new or worsening symptoms and is stable at the time of discharge.    The patient is referred to a primary physician for blood pressure management, diabetic screening, and for all other preventative health concerns.    DISPOSITION:  Patient will be discharged home in stable condition.    FOLLOW UP:  No follow-up provider specified.    OUTPATIENT MEDICATIONS:  There are no discharge medications for this patient.        FINAL DIAGNOSIS  1. Alcoholic intoxication without complication (HCC)         Electronically signed by: Megha Fragoso M.D., 8/24/2024 9:29 PM

## 2024-08-25 NOTE — ED NOTES
.Patient remains comfortable on gurney, no identifiable needs at this time. Equal chest rise and fall bilaterally, pt connected to  monitor. Safety measures in place, call light within reach.   Bed alarm remains in place

## 2024-08-25 NOTE — ED TRIAGE NOTES
Chief Complaint   Patient presents with    Other     ETOH         PT BIB [facility] coming from [facility], patient heavily intoxicated unable to answer questions, patient given 16 mg of narcan at [facility] with no change. Patient only alert to painful stimuli. Pt pupils are equal and reactive, patient maintaining airway

## 2024-08-25 NOTE — ED PROVIDER NOTES
"ED Provider Note    CHIEF COMPLAINT  Chief Complaint   Patient presents with    Other     ETOH       EXTERNAL RECORDS REVIEWED  Reviewed previous ED visits and previous hospitalization records    HPI/ROS  LIMITATION TO HISTORY   Patient intoxicated unable provide history.  OUTSIDE HISTORIAN(S):  None    Sivakumar Levi is a 28 y.o. male who presents to the emergency department for altered mental status.  The patient was apparently found down.  History of narcotic use and narcotic overdose.  He has received Narcan prior to arrival.  Per report sugar was normal.  Patient is able to answer questions or follow commands.    PAST MEDICAL HISTORY       SURGICAL HISTORY   has a past surgical history that includes other and irrigation & debridement general (Left, 6/11/2024).    FAMILY HISTORY  No family history on file.    SOCIAL HISTORY  Social History     Tobacco Use    Smoking status: Every Day     Types: Cigarettes    Smokeless tobacco: Never   Vaping Use    Vaping status: Some Days   Substance and Sexual Activity    Alcohol use: Yes     Comment: None since 12/17/23; previous hx of ETOH abuse    Drug use: Yes     Comment: Fentanyl    Sexual activity: Not on file       CURRENT MEDICATIONS  Home Medications       Reviewed by Samantha Pérez R.N. (Registered Nurse) on 08/25/24 at 1035  Med List Status: Not Addressed     Medication Last Dose Status        Patient Baudilio Taking any Medications                           ALLERGIES  No Known Allergies    PHYSICAL EXAM  VITAL SIGNS: /82   Pulse 89   Temp 36.4 °C (97.5 °F) (Temporal)   Resp 16   Ht 1.753 m (5' 9\")   Wt 64.4 kg (142 lb)   SpO2 98%   BMI 20.97 kg/m²    Constitutional: Somnolent, arouses a little bit moves all extremities.  Speaks a few words.  No respiratory distress  HENT: Normocephalic, Atraumatic, dried mucus in the left naris, lesion is healing on the left upper lip.  Eyes: PERRL, EOMI, Conjunctiva normal, No discharge.   Neck: Normal range of " motion  Cardiovascular: Normal heart rate, Normal rhythm, No murmurs, No rubs, No gallops.   Thorax & Lungs: Clear to auscultation bilaterally  Abdomen:\Soft, No tenderness  Musculoskeletal: Good range of motion in all major joints.  Neurologic: Opens his eyes to stimulation.  Moans a few words.  Moves all extremities      EKG/LABS  Results for orders placed or performed during the hospital encounter of 08/25/24   CBC WITH DIFFERENTIAL   Result Value Ref Range    WBC 12.2 (H) 4.8 - 10.8 K/uL    RBC 4.94 4.70 - 6.10 M/uL    Hemoglobin 15.7 14.0 - 18.0 g/dL    Hematocrit 46.7 42.0 - 52.0 %    MCV 94.5 81.4 - 97.8 fL    MCH 31.8 27.0 - 33.0 pg    MCHC 33.6 32.3 - 36.5 g/dL    RDW 51.9 (H) 35.9 - 50.0 fL    Platelet Count 320 164 - 446 K/uL    MPV 9.0 9.0 - 12.9 fL    Neutrophils-Polys 71.00 44.00 - 72.00 %    Lymphocytes 21.90 (L) 22.00 - 41.00 %    Monocytes 5.70 0.00 - 13.40 %    Eosinophils 0.70 0.00 - 6.90 %    Basophils 0.40 0.00 - 1.80 %    Immature Granulocytes 0.30 0.00 - 0.90 %    Nucleated RBC 0.00 0.00 - 0.20 /100 WBC    Neutrophils (Absolute) 8.66 (H) 1.82 - 7.42 K/uL    Lymphs (Absolute) 2.67 1.00 - 4.80 K/uL    Monos (Absolute) 0.69 0.00 - 0.85 K/uL    Eos (Absolute) 0.08 0.00 - 0.51 K/uL    Baso (Absolute) 0.05 0.00 - 0.12 K/uL    Immature Granulocytes (abs) 0.04 0.00 - 0.11 K/uL    NRBC (Absolute) 0.00 K/uL   COMP METABOLIC PANEL   Result Value Ref Range    Sodium 147 (H) 135 - 145 mmol/L    Potassium 3.7 3.6 - 5.5 mmol/L    Chloride 108 96 - 112 mmol/L    Co2 26 20 - 33 mmol/L    Anion Gap 13.0 7.0 - 16.0    Glucose 86 65 - 99 mg/dL    Bun 10 8 - 22 mg/dL    Creatinine 0.75 0.50 - 1.40 mg/dL    Calcium 8.8 8.5 - 10.5 mg/dL    Correct Calcium 9.0 8.5 - 10.5 mg/dL    AST(SGOT) 56 (H) 12 - 45 U/L    ALT(SGPT) 37 2 - 50 U/L    Alkaline Phosphatase 133 (H) 30 - 99 U/L    Total Bilirubin <0.2 0.1 - 1.5 mg/dL    Albumin 3.8 3.2 - 4.9 g/dL    Total Protein 7.1 6.0 - 8.2 g/dL    Globulin 3.3 1.9 - 3.5 g/dL     A-G Ratio 1.2 g/dL   DIAGNOSTIC ALCOHOL   Result Value Ref Range    Diagnostic Alcohol 436.9 (HH) <10.1 mg/dL   ESTIMATED GFR   Result Value Ref Range    GFR (CKD-EPI) 126 >60 mL/min/1.73 m 2   POCT glucose device results   Result Value Ref Range    POC Glucose, Blood 126 (H) 65 - 99 mg/dL      I have independently interpreted this EKG    RADIOLOGY/PROCEDURES   I have independently interpreted the diagnostic imaging associated with this visit and am waiting the final reading from the radiologist.   My preliminary interpretation is as follows:     Radiologist interpretation:  CT-HEAD W/O   Final Result         1. No acute intracranial abnormality. No evidence of acute intracranial hemorrhage or mass lesion.                               COURSE & MEDICAL DECISION MAKING    ASSESSMENT, COURSE AND PLAN  Care Narrative:     28-year-old male with acute alcohol intoxication presents to the ED with alcohol intoxication.  Suspect he is intoxicated has been in the past.  Will check his blood sugar.  Will observe him till he is clinically awake.  He is not having any signs of narcotic overdose.  There is no respiratory depression.  His breathing adequately is pupils are not pinpoint.    This patient to be observed till sober and discharged home.    ED OBS: Yes; I am placing the patient in to an observation status due to a diagnostic uncertainty as well as therapeutic intensity. Patient placed in observation status at 10:54 AM, 8/25/2024.     Observation plan is as follows: Patient will be observed until his alcohol level is such that he can be discharged.    Patient was seen initially around 11 AM.  After several hours in the emergency department he has not really had any significant improvement in his mental status.  After several hours of observation I elected to work him up since he is not improving.  He is worked up with labs which confirmed a very elevated alcohol level and a head CT was unremarkable.    At 1700 patient's  care assumed to my partner.  Upon me to discharge the patient was clinically sober.  His disposition is pending sobriety.  He remains unresponsive but protecting his airway.      Patient has received Narcan prior to arrival I do not think he benefit from additional Narcan.      The patient will be turned over to my partner for disposition was clinically sober.        ADDITIONAL PROBLEMS MANAGED  Substance abuse    DISPOSITION AND DISCUSSIONS  I have discussed management of the patient with the following physicians and ELLIS's: Transfer partner at 1700.        Barriers to care at this time, including but not limited to: Patient does not have established PCP, Patient is homeless, and Patient lacks transportation .       FINAL DIAGNOSIS  1. Acute alcoholic intoxication with delirium (HCC)         Electronically signed by: Aram Ayoub M.D., 8/25/2024 10:54 AM

## 2024-08-25 NOTE — ED NOTES
"Pt awake. Alert to painful stimuli. States \"I drank alcohol\" when asked if he took any drugs. Pt denies hitting head.   Moves all 4 extremities. Pupils equal and reactive.   Pt remain on bed alarm and sitter 1:1   "

## 2024-08-25 NOTE — ED TRIAGE NOTES
"  Chief Complaint   Patient presents with    ALOC    Alcohol Intoxication     BIBA after being found lying on the sidewalk with a GCS of 12, no signs of trauma, suspected ETOH. Pt is known to the department for recent fentanyl OD. Pupils PERRL, moving all 4, pt responsive to physical stimulation, incomprehensible speech.      ./63   Pulse 96   Temp 36.1 °C (97 °F) (Temporal)   Resp 12   Ht 1.702 m (5' 7\")   Wt 54.4 kg (120 lb)   SpO2 93%   BMI 18.79 kg/m²     "
clear

## 2024-08-25 NOTE — ED NOTES
.Bedside report received from off going RN/tech: Nickg, assumed care of patient.  POC discussed with patient. Call light within reach, all needs addressed at this time.   Pt maintaing airway  Bed alarm in place.   Fall risk interventions in place: Move the patient closer to the nurse's station, Patient's personal possessions are with in their safe reach, Place socks on patient, Place fall risk sign on patient's door, Give patient urinal if applicable, Keep floor surfaces clean and dry, and Bed Alarm in use (all applicable per [place] Fall risk assessment)   Continuous monitoring: Pulse Ox or Blood Pressure  IVF/IV medications: Not Applicable   Oxygen: Room Air  Bedside sitter: Not Applicable   Isolation: Not Applicable

## 2024-08-26 ENCOUNTER — APPOINTMENT (OUTPATIENT)
Dept: RADIOLOGY | Facility: MEDICAL CENTER | Age: 29
End: 2024-08-26
Attending: STUDENT IN AN ORGANIZED HEALTH CARE EDUCATION/TRAINING PROGRAM
Payer: MEDICAID

## 2024-08-26 ENCOUNTER — HOSPITAL ENCOUNTER (EMERGENCY)
Facility: MEDICAL CENTER | Age: 29
End: 2024-08-27
Attending: STUDENT IN AN ORGANIZED HEALTH CARE EDUCATION/TRAINING PROGRAM
Payer: MEDICAID

## 2024-08-26 DIAGNOSIS — S09.90XA CLOSED HEAD INJURY, INITIAL ENCOUNTER: ICD-10-CM

## 2024-08-26 DIAGNOSIS — K04.7 PERIAPICAL ABSCESS: ICD-10-CM

## 2024-08-26 DIAGNOSIS — E87.6 HYPOKALEMIA: ICD-10-CM

## 2024-08-26 DIAGNOSIS — F10.920 ALCOHOLIC INTOXICATION WITHOUT COMPLICATION (HCC): ICD-10-CM

## 2024-08-26 LAB
ALBUMIN SERPL BCP-MCNC: 3.4 G/DL (ref 3.2–4.9)
ALBUMIN/GLOB SERPL: 1.1 G/DL
ALP SERPL-CCNC: 126 U/L (ref 30–99)
ALT SERPL-CCNC: 29 U/L (ref 2–50)
ANION GAP SERPL CALC-SCNC: 15 MMOL/L (ref 7–16)
AST SERPL-CCNC: 42 U/L (ref 12–45)
BASOPHILS # BLD AUTO: 0.4 % (ref 0–1.8)
BASOPHILS # BLD: 0.07 K/UL (ref 0–0.12)
BILIRUB SERPL-MCNC: <0.2 MG/DL (ref 0.1–1.5)
BUN SERPL-MCNC: 9 MG/DL (ref 8–22)
CALCIUM ALBUM COR SERPL-MCNC: 8.8 MG/DL (ref 8.5–10.5)
CALCIUM SERPL-MCNC: 8.3 MG/DL (ref 8.5–10.5)
CHLORIDE SERPL-SCNC: 109 MMOL/L (ref 96–112)
CO2 SERPL-SCNC: 20 MMOL/L (ref 20–33)
CREAT SERPL-MCNC: 0.75 MG/DL (ref 0.5–1.4)
EOSINOPHIL # BLD AUTO: 0.08 K/UL (ref 0–0.51)
EOSINOPHIL NFR BLD: 0.4 % (ref 0–6.9)
ERYTHROCYTE [DISTWIDTH] IN BLOOD BY AUTOMATED COUNT: 52.3 FL (ref 35.9–50)
ETHANOL BLD-MCNC: >498 MG/DL
GFR SERPLBLD CREATININE-BSD FMLA CKD-EPI: 126 ML/MIN/1.73 M 2
GLOBULIN SER CALC-MCNC: 3 G/DL (ref 1.9–3.5)
GLUCOSE SERPL-MCNC: 157 MG/DL (ref 65–99)
HCT VFR BLD AUTO: 40 % (ref 42–52)
HGB BLD-MCNC: 13.9 G/DL (ref 14–18)
IMM GRANULOCYTES # BLD AUTO: 0.07 K/UL (ref 0–0.11)
IMM GRANULOCYTES NFR BLD AUTO: 0.4 % (ref 0–0.9)
LYMPHOCYTES # BLD AUTO: 2.75 K/UL (ref 1–4.8)
LYMPHOCYTES NFR BLD: 14.9 % (ref 22–41)
MCH RBC QN AUTO: 32.3 PG (ref 27–33)
MCHC RBC AUTO-ENTMCNC: 34.8 G/DL (ref 32.3–36.5)
MCV RBC AUTO: 92.8 FL (ref 81.4–97.8)
MONOCYTES # BLD AUTO: 1.41 K/UL (ref 0–0.85)
MONOCYTES NFR BLD AUTO: 7.7 % (ref 0–13.4)
NEUTROPHILS # BLD AUTO: 14.03 K/UL (ref 1.82–7.42)
NEUTROPHILS NFR BLD: 76.2 % (ref 44–72)
NRBC # BLD AUTO: 0 K/UL
NRBC BLD-RTO: 0 /100 WBC (ref 0–0.2)
PLATELET # BLD AUTO: 321 K/UL (ref 164–446)
PMV BLD AUTO: 9.5 FL (ref 9–12.9)
POTASSIUM SERPL-SCNC: 3.1 MMOL/L (ref 3.6–5.5)
PROT SERPL-MCNC: 6.4 G/DL (ref 6–8.2)
RBC # BLD AUTO: 4.31 M/UL (ref 4.7–6.1)
SODIUM SERPL-SCNC: 144 MMOL/L (ref 135–145)
WBC # BLD AUTO: 18.4 K/UL (ref 4.8–10.8)

## 2024-08-26 PROCEDURE — 99285 EMERGENCY DEPT VISIT HI MDM: CPT

## 2024-08-26 PROCEDURE — 96372 THER/PROPH/DIAG INJ SC/IM: CPT

## 2024-08-26 PROCEDURE — 71045 X-RAY EXAM CHEST 1 VIEW: CPT

## 2024-08-26 PROCEDURE — 80053 COMPREHEN METABOLIC PANEL: CPT

## 2024-08-26 PROCEDURE — 82077 ASSAY SPEC XCP UR&BREATH IA: CPT

## 2024-08-26 PROCEDURE — 700111 HCHG RX REV CODE 636 W/ 250 OVERRIDE (IP): Mod: JZ,UD | Performed by: STUDENT IN AN ORGANIZED HEALTH CARE EDUCATION/TRAINING PROGRAM

## 2024-08-26 PROCEDURE — 36415 COLL VENOUS BLD VENIPUNCTURE: CPT

## 2024-08-26 PROCEDURE — 70450 CT HEAD/BRAIN W/O DYE: CPT

## 2024-08-26 PROCEDURE — 85025 COMPLETE CBC W/AUTO DIFF WBC: CPT

## 2024-08-26 RX ORDER — DROPERIDOL 2.5 MG/ML
2.5 INJECTION, SOLUTION INTRAMUSCULAR; INTRAVENOUS ONCE
Status: COMPLETED | OUTPATIENT
Start: 2024-08-26 | End: 2024-08-26

## 2024-08-26 RX ADMIN — DROPERIDOL 2.5 MG: 2.5 INJECTION, SOLUTION INTRAMUSCULAR; INTRAVENOUS at 22:10

## 2024-08-26 ASSESSMENT — FIBROSIS 4 INDEX: FIB4 SCORE: 0.78

## 2024-08-26 NOTE — ED NOTES
"PIV removed and dress appropriately. Pt refusing dishgare paperwork and yelling \"I am leaving\"  Security at bedside. Pt educted on POC   ERP aware  "

## 2024-08-26 NOTE — ED NOTES
Patient remains comfortable on gurney, no identifiable needs at this time. Equal chest rise and fall bilaterally, pt connected to  monitor. Safety measures in place

## 2024-08-26 NOTE — DISCHARGE SUMMARY
"  ED Observation Discharge Summary    Patient:Sivakumar Levi  Patient : 1995  Patient MRN: [id number]  Patient PCP: Pcp Pt States None    Admit Date: 2024  Discharge Date and Time: 24 7:14 PM  Discharge Diagnosis: Alcohol tox occasion, altered mental status  Discharge Attending: Eitan Bal  Discharge Service: ED Observation    ED Course  Sivakumar is a 28 y.o. male who was evaluated at [facility] with alcohol intoxication and altered mental status.  Plan is to let the patient metabolize alcohol and reexamine him.  Upon [place] valuation patient is alert, oriented, ambulating, eating and drinking.  There is staff feels comfortable with discharge as do I.  He has normal physical exam, benign vital signs and is appropriate for outpatient follow-up.  I counseled him regarding his alcohol abuse disorder, see procedure note.  He was given instructions to follow-up with renal behavioral health.    Z71.9 is a billable ICD-10 code used to specify a medical diagnosis of counseling, unspecified. I spent 4 minutes counseling patint on alcohol abuse.    Discharge Exam:  /58   Pulse 85   Temp 36.4 °C (97.5 °F) (Temporal)   Resp 17   Ht 1.753 m (5' 9\")   Wt 64.4 kg (142 lb)   SpO2 97%   BMI 20.97 kg/m² .    Constitutional: Awake and alert. Nontoxic  HENT:  Grossly normal  Eyes: Grossly normal  Neck: Normal range of motion  Cardiovascular: Normal heart rate   Thorax & Lungs: No respiratory distress  Abdomen: Nontender  Skin:  No pathologic rash.   Extremities: Well perfused  Psychiatric: Affect normal    Labs  Results for orders placed or performed during the hospital encounter of 24   CBC WITH DIFFERENTIAL   Result Value Ref Range    WBC 12.2 (H) 4.8 - 10.8 K/uL    RBC 4.94 4.70 - 6.10 M/uL    Hemoglobin 15.7 14.0 - 18.0 g/dL    Hematocrit 46.7 42.0 - 52.0 %    MCV 94.5 81.4 - 97.8 fL    MCH 31.8 27.0 - 33.0 pg    MCHC 33.6 32.3 - 36.5 g/dL    RDW 51.9 (H) 35.9 - 50.0 fL    Platelet Count 320 " 164 - 446 K/uL    MPV 9.0 9.0 - 12.9 fL    Neutrophils-Polys 71.00 44.00 - 72.00 %    Lymphocytes 21.90 (L) 22.00 - 41.00 %    Monocytes 5.70 0.00 - 13.40 %    Eosinophils 0.70 0.00 - 6.90 %    Basophils 0.40 0.00 - 1.80 %    Immature Granulocytes 0.30 0.00 - 0.90 %    Nucleated RBC 0.00 0.00 - 0.20 /100 WBC    Neutrophils (Absolute) 8.66 (H) 1.82 - 7.42 K/uL    Lymphs (Absolute) 2.67 1.00 - 4.80 K/uL    Monos (Absolute) 0.69 0.00 - 0.85 K/uL    Eos (Absolute) 0.08 0.00 - 0.51 K/uL    Baso (Absolute) 0.05 0.00 - 0.12 K/uL    Immature Granulocytes (abs) 0.04 0.00 - 0.11 K/uL    NRBC (Absolute) 0.00 K/uL   COMP METABOLIC PANEL   Result Value Ref Range    Sodium 147 (H) 135 - 145 mmol/L    Potassium 3.7 3.6 - 5.5 mmol/L    Chloride 108 96 - 112 mmol/L    Co2 26 20 - 33 mmol/L    Anion Gap 13.0 7.0 - 16.0    Glucose 86 65 - 99 mg/dL    Bun 10 8 - 22 mg/dL    Creatinine 0.75 0.50 - 1.40 mg/dL    Calcium 8.8 8.5 - 10.5 mg/dL    Correct Calcium 9.0 8.5 - 10.5 mg/dL    AST(SGOT) 56 (H) 12 - 45 U/L    ALT(SGPT) 37 2 - 50 U/L    Alkaline Phosphatase 133 (H) 30 - 99 U/L    Total Bilirubin <0.2 0.1 - 1.5 mg/dL    Albumin 3.8 3.2 - 4.9 g/dL    Total Protein 7.1 6.0 - 8.2 g/dL    Globulin 3.3 1.9 - 3.5 g/dL    A-G Ratio 1.2 g/dL   DIAGNOSTIC ALCOHOL   Result Value Ref Range    Diagnostic Alcohol 436.9 (HH) <10.1 mg/dL   ESTIMATED GFR   Result Value Ref Range    GFR (CKD-EPI) 126 >60 mL/min/1.73 m 2   POCT glucose device results   Result Value Ref Range    POC Glucose, Blood 126 (H) 65 - 99 mg/dL       Radiology  CT-HEAD W/O   Final Result         1. No acute intracranial abnormality. No evidence of acute intracranial hemorrhage or mass lesion.                               Medications:   New Prescriptions    No medications on file       My final assessment includes repeat physical exam, reevaluation, review of labs and imaging as well as vital signs    Upon Reevaluation, the patient's condition has: Improved; and will be  discharged.    Patient discharged from ED Observation status at 7:15 PM (Time) 8/25/2024 (Date).     Total time spent on this ED Observation discharge encounter is > 30 Minutes    Electronically signed by: Eitan Bal, 8/25/2024 7:14 PM

## 2024-08-26 NOTE — ED NOTES
Pt stable for discharge. Pt refused discharge paperwork. Pt AoX 4. Pt ambulated independently with balanced and steady gait out of the ED with all belongings. Pt encouraged to come back if symptoms worsen.

## 2024-08-26 NOTE — ED NOTES
Pt awake and alert speaking in complete sentences. Pt eating food and drinking water with no problems.   Pt Aox4. Pt denies SI/HI. Pt ambulate around the room indep with balanced gait.

## 2024-08-26 NOTE — DISCHARGE INSTRUCTIONS
Stop drinking alcohol.  Follow-up with your PCP.  Come back if worsening symptoms.  Thank you for coming in today.

## 2024-08-27 ENCOUNTER — HOSPITAL ENCOUNTER (EMERGENCY)
Facility: MEDICAL CENTER | Age: 29
End: 2024-08-28
Attending: EMERGENCY MEDICINE
Payer: MEDICAID

## 2024-08-27 VITALS
TEMPERATURE: 97.3 F | OXYGEN SATURATION: 95 % | WEIGHT: 142 LBS | HEART RATE: 83 BPM | BODY MASS INDEX: 20.97 KG/M2 | RESPIRATION RATE: 16 BRPM | SYSTOLIC BLOOD PRESSURE: 104 MMHG | DIASTOLIC BLOOD PRESSURE: 63 MMHG

## 2024-08-27 VITALS
DIASTOLIC BLOOD PRESSURE: 88 MMHG | WEIGHT: 142 LBS | SYSTOLIC BLOOD PRESSURE: 128 MMHG | HEIGHT: 69 IN | RESPIRATION RATE: 20 BRPM | TEMPERATURE: 97.8 F | BODY MASS INDEX: 21.03 KG/M2 | HEART RATE: 87 BPM | OXYGEN SATURATION: 96 %

## 2024-08-27 DIAGNOSIS — R40.1 STUPOR: ICD-10-CM

## 2024-08-27 DIAGNOSIS — F10.921 ALCOHOL INTOXICATION WITH DELIRIUM (HCC): ICD-10-CM

## 2024-08-27 LAB
ALBUMIN SERPL BCP-MCNC: 3.5 G/DL (ref 3.2–4.9)
ALBUMIN/GLOB SERPL: 1 G/DL
ALP SERPL-CCNC: 155 U/L (ref 30–99)
ALT SERPL-CCNC: 29 U/L (ref 2–50)
ANION GAP SERPL CALC-SCNC: 14 MMOL/L (ref 7–16)
AST SERPL-CCNC: 43 U/L (ref 12–45)
BASOPHILS # BLD AUTO: 0.4 % (ref 0–1.8)
BASOPHILS # BLD: 0.06 K/UL (ref 0–0.12)
BILIRUB SERPL-MCNC: 0.2 MG/DL (ref 0.1–1.5)
BUN SERPL-MCNC: 6 MG/DL (ref 8–22)
CALCIUM ALBUM COR SERPL-MCNC: 8.9 MG/DL (ref 8.5–10.5)
CALCIUM SERPL-MCNC: 8.5 MG/DL (ref 8.5–10.5)
CHLORIDE SERPL-SCNC: 107 MMOL/L (ref 96–112)
CO2 SERPL-SCNC: 25 MMOL/L (ref 20–33)
CREAT SERPL-MCNC: 0.68 MG/DL (ref 0.5–1.4)
EOSINOPHIL # BLD AUTO: 0.04 K/UL (ref 0–0.51)
EOSINOPHIL NFR BLD: 0.3 % (ref 0–6.9)
ERYTHROCYTE [DISTWIDTH] IN BLOOD BY AUTOMATED COUNT: 51 FL (ref 35.9–50)
ETHANOL BLD-MCNC: 453.1 MG/DL
GFR SERPLBLD CREATININE-BSD FMLA CKD-EPI: 130 ML/MIN/1.73 M 2
GLOBULIN SER CALC-MCNC: 3.6 G/DL (ref 1.9–3.5)
GLUCOSE SERPL-MCNC: 116 MG/DL (ref 65–99)
HCT VFR BLD AUTO: 43.2 % (ref 42–52)
HGB BLD-MCNC: 15 G/DL (ref 14–18)
IMM GRANULOCYTES # BLD AUTO: 0.06 K/UL (ref 0–0.11)
IMM GRANULOCYTES NFR BLD AUTO: 0.4 % (ref 0–0.9)
LYMPHOCYTES # BLD AUTO: 2.29 K/UL (ref 1–4.8)
LYMPHOCYTES NFR BLD: 15 % (ref 22–41)
MCH RBC QN AUTO: 31.8 PG (ref 27–33)
MCHC RBC AUTO-ENTMCNC: 34.7 G/DL (ref 32.3–36.5)
MCV RBC AUTO: 91.7 FL (ref 81.4–97.8)
MONOCYTES # BLD AUTO: 0.44 K/UL (ref 0–0.85)
MONOCYTES NFR BLD AUTO: 2.9 % (ref 0–13.4)
NEUTROPHILS # BLD AUTO: 12.36 K/UL (ref 1.82–7.42)
NEUTROPHILS NFR BLD: 81 % (ref 44–72)
NRBC # BLD AUTO: 0 K/UL
NRBC BLD-RTO: 0 /100 WBC (ref 0–0.2)
PLATELET # BLD AUTO: 340 K/UL (ref 164–446)
PMV BLD AUTO: 9.2 FL (ref 9–12.9)
POTASSIUM SERPL-SCNC: 3.4 MMOL/L (ref 3.6–5.5)
PROT SERPL-MCNC: 7.1 G/DL (ref 6–8.2)
RBC # BLD AUTO: 4.71 M/UL (ref 4.7–6.1)
SODIUM SERPL-SCNC: 146 MMOL/L (ref 135–145)
WBC # BLD AUTO: 15.3 K/UL (ref 4.8–10.8)

## 2024-08-27 PROCEDURE — 99285 EMERGENCY DEPT VISIT HI MDM: CPT

## 2024-08-27 PROCEDURE — 80053 COMPREHEN METABOLIC PANEL: CPT

## 2024-08-27 PROCEDURE — 96374 THER/PROPH/DIAG INJ IV PUSH: CPT

## 2024-08-27 PROCEDURE — 82077 ASSAY SPEC XCP UR&BREATH IA: CPT

## 2024-08-27 PROCEDURE — 700102 HCHG RX REV CODE 250 W/ 637 OVERRIDE(OP): Mod: UD | Performed by: STUDENT IN AN ORGANIZED HEALTH CARE EDUCATION/TRAINING PROGRAM

## 2024-08-27 PROCEDURE — A9270 NON-COVERED ITEM OR SERVICE: HCPCS | Mod: UD | Performed by: STUDENT IN AN ORGANIZED HEALTH CARE EDUCATION/TRAINING PROGRAM

## 2024-08-27 PROCEDURE — 700111 HCHG RX REV CODE 636 W/ 250 OVERRIDE (IP): Mod: JZ,UD | Performed by: EMERGENCY MEDICINE

## 2024-08-27 PROCEDURE — 85025 COMPLETE CBC W/AUTO DIFF WBC: CPT

## 2024-08-27 PROCEDURE — 36415 COLL VENOUS BLD VENIPUNCTURE: CPT

## 2024-08-27 RX ORDER — DROPERIDOL 2.5 MG/ML
5 INJECTION, SOLUTION INTRAMUSCULAR; INTRAVENOUS ONCE
Status: COMPLETED | OUTPATIENT
Start: 2024-08-27 | End: 2024-08-27

## 2024-08-27 RX ORDER — IBUPROFEN 600 MG/1
600 TABLET, FILM COATED ORAL ONCE
Status: COMPLETED | OUTPATIENT
Start: 2024-08-27 | End: 2024-08-27

## 2024-08-27 RX ADMIN — IBUPROFEN 600 MG: 600 TABLET, FILM COATED ORAL at 02:18

## 2024-08-27 RX ADMIN — DROPERIDOL 5 MG: 2.5 INJECTION, SOLUTION INTRAMUSCULAR; INTRAVENOUS at 19:00

## 2024-08-27 RX ADMIN — AMOXICILLIN AND CLAVULANATE POTASSIUM 1 TABLET: 875; 125 TABLET, FILM COATED ORAL at 04:25

## 2024-08-27 ASSESSMENT — FIBROSIS 4 INDEX: FIB4 SCORE: 0.66

## 2024-08-27 NOTE — ED PROVIDER NOTES
CHIEF COMPLAINT  Chief Complaint   Patient presents with    Alcohol Intoxication     Possible ETOH+ drug use          LIMITATION TO HISTORY   Select: Alcohol intoxication    HPI    Sivakumar Levi is a 28 y.o. male who presents to the Emergency Department presented for evaluation of possible alcohol intoxication.  Patient was found sleeping outside of the [facility].  Per staff has a history of alcohol and narcotic abuse.  No history is limited due to suspected alcohol intoxication.    OUTSIDE HISTORIAN(S):  Select: EMS reports a GCS of 8 upon their arrival    EXTERNAL RECORDS REVIEWED  Select: Other patients had multiple ED visits or recently for evaluation of altered mental status in the setting of significantly alcohol-related alcohol abuse      PAST MEDICAL HISTORY  No past medical history on file.  .    SURGICAL HISTORY  Past Surgical History:   Procedure Laterality Date    IRRIGATION & DEBRIDEMENT GENERAL Left 6/11/2024    Procedure: IRRIGATION AND DEBRIDEMENT, LEFT THUMB ABSCESS;  Surgeon: Oscar Bullock M.D.;  Location: SURGERY [facility];  Service: Orthopedics    OTHER      small tumor removed from throat as a baby         FAMILY HISTORY  No family history on file.       SOCIAL HISTORY  Social History     Socioeconomic History    Marital status: Single     Spouse name: Not on file    Number of children: Not on file    Years of education: Not on file    Highest education level: Not on file   Occupational History    Not on file   Tobacco Use    Smoking status: Every Day     Types: Cigarettes    Smokeless tobacco: Never   Vaping Use    Vaping status: Some Days   Substance and Sexual Activity    Alcohol use: Yes     Comment: None since 12/17/23; previous hx of ETOH abuse    Drug use: Yes     Comment: Fentanyl    Sexual activity: Not on file   Other Topics Concern    Not on file   Social History Narrative    Not on file     Social Determinants of Health     Financial Resource Strain: Not on file   Food  "Insecurity: Food Insecurity Present (6/11/2024)    Hunger Vital Sign     Worried About Running Out of Food in the Last Year: Often true     Ran Out of Food in the Last Year: Sometimes true   Transportation Needs: No Transportation Needs (6/11/2024)    PRAPARE - Transportation     Lack of Transportation (Medical): No     Lack of Transportation (Non-Medical): No   Physical Activity: Not on file   Stress: Not on file   Social Connections: Not on file   Intimate Partner Violence: Not At Risk (6/11/2024)    Humiliation, Afraid, Rape, and Kick questionnaire     Fear of Current or Ex-Partner: No     Emotionally Abused: No     Physically Abused: No     Sexually Abused: No   Housing Stability: High Risk (6/11/2024)    Housing Stability Vital Sign     Unable to Pay for Housing in the Last Year: Yes     Number of Places Lived in the Last Year: 2     Unstable Housing in the Last Year: Yes         CURRENT MEDICATIONS  No current facility-administered medications on file prior to encounter.     No current outpatient medications on file prior to encounter.           ALLERGIES  No Known Allergies    PHYSICAL EXAM  VITAL SIGNS:/71   Pulse 97   Temp 36.6 °C (97.8 °F) (Temporal)   Resp 18   Ht 1.753 m (5' 9\")   Wt 64.4 kg (142 lb)   SpO2 95%   BMI 20.97 kg/m²       VITALS - vital signs documented prior to this note have been reviewed and noted,  HEENT - normocephalic, atraumatic, pupils equal, sclera anicteric, mucus  membranes moist  NECK - supple, no meningismus, full active range of motion, trachea midline  CARDIOVASCULAR - regular rate/rhythm, no murmurs/gallops/rubs  PULMONARY - no respiratory distress, speaking in full sentences, clear to  auscultation bilaterally, no wheezing/ronchi/rales, no accessory muscle use  GASTROINTESTINAL - soft, non-tender, non-distended, no rebound, guarding,  or peritonitis  GENITOURINARY - Deferred  NEUROLOGIC - sleeping arousable localizes to pain pupils are 4 and reactive moving all " extremities mumbles incomprehensible sounds  MUSCULOSKELETAL - no obvious asymmetry or deformities present      DIAGNOSTIC STUDIES / PROCEDURES      LABS  Labs Reviewed   DIAGNOSTIC ALCOHOL - Abnormal; Notable for the following components:       Result Value    Diagnostic Alcohol >498.0 (*)     All other components within normal limits   CBC WITH DIFFERENTIAL - Abnormal; Notable for the following components:    WBC 18.4 (*)     RBC 4.31 (*)     Hemoglobin 13.9 (*)     Hematocrit 40.0 (*)     RDW 52.3 (*)     Neutrophils-Polys 76.20 (*)     Lymphocytes 14.90 (*)     Neutrophils (Absolute) 14.03 (*)     Monos (Absolute) 1.41 (*)     All other components within normal limits   COMP METABOLIC PANEL - Abnormal; Notable for the following components:    Potassium 3.1 (*)     Glucose 157 (*)     Calcium 8.3 (*)     Alkaline Phosphatase 126 (*)     All other components within normal limits   ESTIMATED GFR       Alcohol significantly elevated does have a leukocytosis and obvious infectious source at this time doubt sepsis  RADIOLOGY  I have independently interpreted the diagnostic imaging associated with this visit and am waiting the final reading from the radiologist.   My preliminary interpretation is as follows: CT head negative for bleed      Radiologist interpretation:   DX-CHEST-PORTABLE (1 VIEW)   Final Result         1.  No acute cardiopulmonary disease.      CT-HEAD W/O   Final Result         1.  No acute intracranial abnormality.                    COURSE & MEDICAL DECISION MAKING    ED COURSE:    ED Observation Status? Yes I am placing the patient in to an observation status due to a diagnostic uncertainty as well as therapeutic intensity. Patient placed in observation status at 8:09 PM 8/26/2024    Observation plan is as follows: Observation for clinical sobriety    INTERVENTIONS BY ME:  Medications   droperidol (Inapsine) injection 2.5 mg (2.5 mg Intramuscular Given 8/26/24 2210)       Response on recheck:  Evaluation and 2112 patient attempted get out of bed had a mechanical fall striking his face on the ground CT head is ordered.        Critical care    Critical Care Procedure Note    Total critical care time: Approximately 36 minutes    Upon my assess due to a high probability of clinically significant, life threatening deterioration, secondary to agitation requiring sedation protocol as I do feel the patient is a danger to himself the patient is grossly intoxicated he has already fallen once getting out of bed and is attempting to get out of bed again he is not redirectable to verbal commands the patient required my direct attention and intervention. This critical care time included obtaining a history; examining the patient; pulse oximetry; ordering and review of studies; arranging urgent treatment with development of a management plan; evaluation of patient's response to treatment; frequent reassessment; and, discussions with other providers.    was exclusive of separately billable procedures and treating other patients and teaching time.    INITIAL ASSESSMENT, COURSE AND PLAN  Care Narrative: Patient presented for evaluation of altered mental status in the setting of likely alcohol intoxication.  Patient does have frequent visits to the ER for evaluation of alcohol intoxication.  Initially upon assessment the patient was sleeping smell to consume Dr. Beverages he was arousable to painful stimulus.  Obtained labs which did show a hypokalemia as well as a significantly elevated alcohol.  Was placed into ED observation pending clinical sobriety when he got up attempted to walk out of the emergency department and promptly fell on his face.  There is a small amount of bleeding from his nose though no appreciable septal hematomas or maxillofacial instability.  Did obtain a chest x-ray as well as a CT head both which were negative.  Afterwards he did become agitated and required sedation as he was repeatedly trying to  get out of bed and is already fallen once today thus he was given droperidol for patient and staff safety.    He will be placed into ED observation pending clinical sobriety             ADDITIONAL PROBLEM LIST  History of alcohol abuse  DISPOSITION AND DISCUSSIONS      Escalation of care considered, and ultimately not performed:IV fluids    Barriers to care at this time, including but not limited to: Patient does not have established PCP.     Decision tools and prescription drugs considered including, but not limited to:  Fails [place] CT head rules due to intoxication .    FINAL DIAGNOSIS  1. Alcoholic intoxication without complication (HCC)    2. Hypokalemia    3. Closed head injury, initial encounter             Electronically signed by: Hong Gaston DO ,11:11 PM 08/26/24

## 2024-08-27 NOTE — DISCHARGE SUMMARY
"  ED Observation Discharge Summary    Patient:Sivakumar Levi  Patient : 1995  Patient MRN: [id number]  Patient PCP: Pcp Pt States None    Admit Date: 2024  Discharge Date and Time: 24 4:22 AM  Discharge Diagnosis: Alcohol intoxication, periapical abscess  Discharge Attending: Jesús Walker D.O.  Discharge Service: ED Observation    ED Course  Sivakumar is a 28 y.o. male who was evaluated at [facility] for severe alcohol intoxication.  Baseline labs reveal a leukocytosis 18.4.  His chemistry reveals mild hypokalemia but is otherwise unremarkable.  His alcohol level was exceedingly high.  CT scan of the head was unremarkable.    I was called back to the room when patient said that he wanted to go home.  He was restless and walking about the room.  He was feeling symptoms of mild withdrawal but he was not delirious he was not hallucinating.  I offered to treat him with medication for alcohol withdrawal    He did have left jaw fullness, he allowed me briefly to take a look in his mouth and he had a very large left periapical abscess which I offered to drain but he declined.  I did advise him that he followed up with a dentist.  I gave him a dose of Augmentin in the emergency department.  I strongly recommended that he stay so that I could perform incision and drainage of the periapical abscess but he continued to decline.  He does seem to have capacity make his own medical decisions.  He was prescribed a 10-day course of Augmentin, not convinced that he is going to pick them up.  I did discuss strict return precautions with him advised him come back for worsening swelling.  Patient is appropriate for discharge at this time      Discharge Exam:  /88   Pulse 87   Temp 36.6 °C (97.8 °F) (Temporal)   Resp 20   Ht 1.753 m (5' 9\")   Wt 64.4 kg (142 lb)   SpO2 96%   BMI 20.97 kg/m² .    General: Restless, pacing about the room  Neuro: oriented x 3, moving all extremities.   HEENT:   - Head: " Normocephalic, atraumatic  - Eyes: PERRL  - Ears/Nose: normal external nose and ears  - Mouth: Left jaw fullness.  He has a very large left periapical abscess about tooth number 17, 18, poor dentition.  No trismus.  No sublingual edema.  Resp: clear to auscultation, no increased work of breathing  CV: Regular rate and rhythm  Abd: Soft, non-tender, non-distended  Extremities: No obvious tremor  Psych: No active hallucinations            Labs  Results for orders placed or performed during the hospital encounter of 08/26/24   DIAGNOSTIC ALCOHOL   Result Value Ref Range    Diagnostic Alcohol >498.0 (HH) <10.1 mg/dL   CBC WITH DIFFERENTIAL   Result Value Ref Range    WBC 18.4 (H) 4.8 - 10.8 K/uL    RBC 4.31 (L) 4.70 - 6.10 M/uL    Hemoglobin 13.9 (L) 14.0 - 18.0 g/dL    Hematocrit 40.0 (L) 42.0 - 52.0 %    MCV 92.8 81.4 - 97.8 fL    MCH 32.3 27.0 - 33.0 pg    MCHC 34.8 32.3 - 36.5 g/dL    RDW 52.3 (H) 35.9 - 50.0 fL    Platelet Count 321 164 - 446 K/uL    MPV 9.5 9.0 - 12.9 fL    Neutrophils-Polys 76.20 (H) 44.00 - 72.00 %    Lymphocytes 14.90 (L) 22.00 - 41.00 %    Monocytes 7.70 0.00 - 13.40 %    Eosinophils 0.40 0.00 - 6.90 %    Basophils 0.40 0.00 - 1.80 %    Immature Granulocytes 0.40 0.00 - 0.90 %    Nucleated RBC 0.00 0.00 - 0.20 /100 WBC    Neutrophils (Absolute) 14.03 (H) 1.82 - 7.42 K/uL    Lymphs (Absolute) 2.75 1.00 - 4.80 K/uL    Monos (Absolute) 1.41 (H) 0.00 - 0.85 K/uL    Eos (Absolute) 0.08 0.00 - 0.51 K/uL    Baso (Absolute) 0.07 0.00 - 0.12 K/uL    Immature Granulocytes (abs) 0.07 0.00 - 0.11 K/uL    NRBC (Absolute) 0.00 K/uL   CMP   Result Value Ref Range    Sodium 144 135 - 145 mmol/L    Potassium 3.1 (L) 3.6 - 5.5 mmol/L    Chloride 109 96 - 112 mmol/L    Co2 20 20 - 33 mmol/L    Anion Gap 15.0 7.0 - 16.0    Glucose 157 (H) 65 - 99 mg/dL    Bun 9 8 - 22 mg/dL    Creatinine 0.75 0.50 - 1.40 mg/dL    Calcium 8.3 (L) 8.5 - 10.5 mg/dL    Correct Calcium 8.8 8.5 - 10.5 mg/dL    AST(SGOT) 42 12 - 45 U/L     ALT(SGPT) 29 2 - 50 U/L    Alkaline Phosphatase 126 (H) 30 - 99 U/L    Total Bilirubin <0.2 0.1 - 1.5 mg/dL    Albumin 3.4 3.2 - 4.9 g/dL    Total Protein 6.4 6.0 - 8.2 g/dL    Globulin 3.0 1.9 - 3.5 g/dL    A-G Ratio 1.1 g/dL   ESTIMATED GFR   Result Value Ref Range    GFR (CKD-EPI) 126 >60 mL/min/1.73 m 2       Radiology  DX-CHEST-PORTABLE (1 VIEW)   Final Result         1.  No acute cardiopulmonary disease.      CT-HEAD W/O   Final Result         1.  No acute intracranial abnormality.                   Medications:   New Prescriptions    No medications on file       My final assessment includes alcohol intoxication, periapical abscess  Upon Reevaluation, the patient's condition has: Improved; and will be discharged.    Patient discharged from ED Observation status at 4:28 AM on 8/27/2024    Total time spent on this ED Observation discharge encounter is < 30 Minutes    Electronically signed by: Jesús Walker D.O., 8/27/2024 4:22 AM

## 2024-08-27 NOTE — ED TRIAGE NOTES
"Chief Complaint   Patient presents with    Alcohol Intoxication     Possible ETOH+ drug use        Pt BIBA from [facility] for the above mentioned reason. Per EMS report pt was found on ground lying on his side position by [facility] staff. As per facility staff pt tends to drink alcohol and pt has might drink a lot today. Pt has a history of fentanyl overdose as well in past. Per EMS pt pupils equally reactive to light no pinpoint. Pt GCS 8 by EMS.     /58   Pulse (!) 115   Temp 36.6 °C (97.8 °F) (Temporal)   Resp 19   Ht 1.753 m (5' 9\")   Wt 64.4 kg (142 lb)   SpO2 93%   BMI 20.97 kg/m²     "

## 2024-08-27 NOTE — ED NOTES
Pt tried to get out of bed and had fall, pt sustained nose trauma, bleeding notice. ERP made aware of.

## 2024-08-27 NOTE — ED NOTES
Pt ripped off pulse ox and b.p cuff, pt instructed to stay on bed. Pt reconnected to the monitor.

## 2024-08-27 NOTE — ED NOTES
Pt discharged home. GCS 15. IV discontinued and gauze placed, pt in possession of belongings. Pt provided discharge education and information pertaining to medications and dentist follow up appointments. Pt received copy of discharge instructions and verbalized understanding.

## 2024-08-27 NOTE — ED TRIAGE NOTES
Pt to rm g30.  Chief Complaint   Patient presents with    Alcohol Intoxication     Pt ruthy brownlee from [facility] for evaluation of possible alcohol intoxication.  Patient was found sleeping outside of the [facility] with AMS. Hst of etoh abuse

## 2024-08-27 NOTE — ED PROVIDER NOTES
ER Provider Note    Scribed for Eduin Claudio M.D. by Dejah Tamayo. 8/27/2024   4:43 PM    Primary Care Provider: None noted    CHIEF COMPLAINT  Chief Complaint   Patient presents with    Alcohol Intoxication     Pt ruthy brownlee from [facility] for evaluation of possible alcohol intoxication.  Patient was found sleeping outside of the [facility] with AMS. Hst of etoh abuse      EXTERNAL RECORDS REVIEWED  Outpatient Notes Patient was seen here for alcohol intoxication yesterday. He was discharged 12 hours ago. His alcohol was greater than 500. Patient fell while he was here. He was found to have left dental abscess that they offered to drain but he did not want.       HPI/ROS  LIMITATION TO HISTORY   Select: Intoxication  OUTSIDE HISTORIAN(S):   Nurse report      Sivakumar Levi is a 28 y.o. male with a history of alcohol abuse who presents to the ED via EMS for evaluation of altered mental status onset prior to arrival. Per nurse, patient attempted to walk. Patient was found sleeping outside of [facility] with altered mental status.  No alleviating or exacerbating factors were noted.    PAST MEDICAL HISTORY  History reviewed. No pertinent past medical history.    SURGICAL HISTORY  Past Surgical History:   Procedure Laterality Date    IRRIGATION & DEBRIDEMENT GENERAL Left 6/11/2024    Procedure: IRRIGATION AND DEBRIDEMENT, LEFT THUMB ABSCESS;  Surgeon: Oscar Bullock M.D.;  Location: SURGERY [facility];  Service: Orthopedics    OTHER      small tumor removed from throat as a baby     FAMILY HISTORY  No pertinent family history     SOCIAL HISTORY   reports that he has been smoking cigarettes. He has never used smokeless tobacco. He reports current alcohol use. He reports current drug use.    CURRENT MEDICATIONS  Previous Medications    AMOXICILLIN-CLAVULANATE (AUGMENTIN) 875-125 MG TAB    Take 1 Tablet by mouth 2 times a day for 10 days.     ALLERGIES  No Known Allergies     PHYSICAL EXAM  /78    Pulse 100   Temp 36.3 °C (97.3 °F) (Temporal)   Resp 16   Wt 64.4 kg (142 lb)   SpO2 92%   BMI 20.97 kg/m²    Constitutional: Well developed, Well nourished, no acute distress, Disheveled   HENT: Normocephalic, Atraumatic, Slight swelling to the left mandibular area with an abrasion on his left lip   Eyes: PERRLA, EOMI, Conjunctiva normal, No discharge.   Neck: No tenderness, Supple, No stridor.   Cardiovascular: Normal heart rate, Normal rhythm.   Thorax & Lungs: Clear to auscultation bilaterally, No respiratory distress.   Abdomen: Soft, No tenderness, No masses.   Skin: Warm, Dry, No rash.  Abrasions on his knuckles   Musculoskeletal: No major deformities noted.  Neurologic:  Will open his eyes but will not follow commands, Non verbal   Psychiatric: Unable to access due to intoxication     DIAGNOSTIC STUDIES  Labs:   Results for orders placed or performed during the hospital encounter of 08/27/24   Blood Alcohol   Result Value Ref Range    Diagnostic Alcohol 453.1 (HH) <10.1 mg/dL   CBC WITH DIFFERENTIAL   Result Value Ref Range    WBC 15.3 (H) 4.8 - 10.8 K/uL    RBC 4.71 4.70 - 6.10 M/uL    Hemoglobin 15.0 14.0 - 18.0 g/dL    Hematocrit 43.2 42.0 - 52.0 %    MCV 91.7 81.4 - 97.8 fL    MCH 31.8 27.0 - 33.0 pg    MCHC 34.7 32.3 - 36.5 g/dL    RDW 51.0 (H) 35.9 - 50.0 fL    Platelet Count 340 164 - 446 K/uL    MPV 9.2 9.0 - 12.9 fL    Neutrophils-Polys 81.00 (H) 44.00 - 72.00 %    Lymphocytes 15.00 (L) 22.00 - 41.00 %    Monocytes 2.90 0.00 - 13.40 %    Eosinophils 0.30 0.00 - 6.90 %    Basophils 0.40 0.00 - 1.80 %    Immature Granulocytes 0.40 0.00 - 0.90 %    Nucleated RBC 0.00 0.00 - 0.20 /100 WBC    Neutrophils (Absolute) 12.36 (H) 1.82 - 7.42 K/uL    Lymphs (Absolute) 2.29 1.00 - 4.80 K/uL    Monos (Absolute) 0.44 0.00 - 0.85 K/uL    Eos (Absolute) 0.04 0.00 - 0.51 K/uL    Baso (Absolute) 0.06 0.00 - 0.12 K/uL    Immature Granulocytes (abs) 0.06 0.00 - 0.11 K/uL    NRBC (Absolute) 0.00 K/uL   COMP  METABOLIC PANEL   Result Value Ref Range    Sodium 146 (H) 135 - 145 mmol/L    Potassium 3.4 (L) 3.6 - 5.5 mmol/L    Chloride 107 96 - 112 mmol/L    Co2 25 20 - 33 mmol/L    Anion Gap 14.0 7.0 - 16.0    Glucose 116 (H) 65 - 99 mg/dL    Bun 6 (L) 8 - 22 mg/dL    Creatinine 0.68 0.50 - 1.40 mg/dL    Calcium 8.5 8.5 - 10.5 mg/dL    Correct Calcium 8.9 8.5 - 10.5 mg/dL    AST(SGOT) 43 12 - 45 U/L    ALT(SGPT) 29 2 - 50 U/L    Alkaline Phosphatase 155 (H) 30 - 99 U/L    Total Bilirubin 0.2 0.1 - 1.5 mg/dL    Albumin 3.5 3.2 - 4.9 g/dL    Total Protein 7.1 6.0 - 8.2 g/dL    Globulin 3.6 (H) 1.9 - 3.5 g/dL    A-G Ratio 1.0 g/dL   ESTIMATED GFR   Result Value Ref Range    GFR (CKD-EPI) 130 >60 mL/min/1.73 m 2     COURSE & MEDICAL DECISION MAKING     ED OBS: Yes; I am placing the patient in to an observation status due to a diagnostic uncertainty as well as therapeutic intensity. Patient placed in observation status at 4:43 PM, 8/27/2024.     Observation plan is as follows: Metabolize to freedom     INITIAL ASSESSMENT, COURSE AND PLAN  Care Narrative: Patient was just discharged 12 hours ago with alcohol intoxication with a blood alcohol of greater then 0.498, the patient returns stuporous agitated at times laboratory tests confirm the patient is intoxicated.  The patient ultimately got agitated and had to be restrained, had to give the patient droperidol for his agitation.  Now we are just monitoring the patient until the patient is clinically safe on his feet and sober.  Patient is under ED observation.    4:43 PM - Patient was evaluated at bedside. Ordered for UDS, blood alcohol, CBC w diff, CMP to evaluate. Patient verbalizes understanding and support with my plan of care.     6:51 PM - Called by nurse to patient's room acutely. Patient was reevaluated at bedside. Patient was agitated. Given droperidol 5 mg.     DISPOSITION AND DISCUSSIONS    I have discussed management of the patient with the following physicians and  ELLIS's:  None    Discussion of management with other Q or appropriate source(s): None     Escalation of care considered, and ultimately not performed: diagnostic imaging.    Barriers to care at this time, including but not limited to:  None .     Patient will remain in ED Observation.     FINAL DIAGNOSIS  1. Stupor    2. Alcohol intoxication with delirium (HCC)       Dejah ARENAS (Scribe), am scribing for, and in the presence of, Eduin Claudio M.D..    Electronically signed by: Dejah Tamayo (Scribe), 8/27/2024    IEduin M.D. personally performed the services described in this documentation, as scribed by Dejah Tamayo in my presence, and it is both accurate and complete.      The note accurately reflects work and decisions made by me.  Eduin Claudio M.D.  8/27/2024  9:50 PM   General

## 2024-08-27 NOTE — ED NOTES
ERP at bedside now examine pt condition, pt oral examination done. Pt don't want to wait and wants to leave.

## 2024-08-27 NOTE — ED NOTES
Pt woke up and got out of bed, pacing inside the room. Pt trying to leave the ER, pt advice to wait until the doctor comes, pt restless and continuous pacing inside the room. ERP made aware of pt condition.

## 2024-08-27 NOTE — ED NOTES
Pt tried to get out of bed, pt redirect to stay on gurney. Pt need reinforcement. Pt room door and curtains open for direct view of RN.

## 2024-08-27 NOTE — ED NOTES
Pt trying to get out of bed stated he wants to use the restroom, pt redirect to bed and urinal offered. Pt peed inside urinal with standby assist of ER Tech.

## 2024-08-28 NOTE — ED NOTES
Pt awake, states he's ready to be discharge. Pt provided fresh cup of water, pt tolerated PO well. Pt denies any nausea. Pt pacing in the room w/ steady gait. ERP notified.

## 2024-08-28 NOTE — ED NOTES
Assist RN: Patient ambulated to bathroom with steady gait. Currently pacing around room. ERP notified.

## 2024-08-28 NOTE — ED NOTES
Pt attempting to get out of bed and take off bp cuff/pulse ox. 1:1 sitter outside room, security called

## 2024-08-28 NOTE — ED NOTES
Assist RN: Pt discharged, all appropriate hospital equipment removed (IV, monitor, pulse ox, etc.). Pt left unit via walking with stable gait to ED lobby for discharge off campus. Personal belongings with pt when leaving unit. Pt given discharge instructions prior to leaving unit including where to  prescriptions and when to follow-up; verbalizes understanding. Pt informed to return to ED if symptoms worsen/return or altered status develop. Copy of discharge instructions signed and turned into DC basket and copy sent with pt.

## 2024-08-28 NOTE — ED NOTES
Bedside report from MIRELLA Hidalgo. Pt on lavernrney in lowest, locked position, on RA, and continuous pulse ox monitoring. Fall precautions in place, including fall risk sign. Pt updated on plan of care. No needs at this time. Call light within reach. Safety sitter in hallway performing direct observation.

## 2024-08-28 NOTE — ED NOTES
Pt uncooperative attempting to swing at staff and pull off pulse ox, iv ect.  Security called and pt medicated with inapsine

## 2024-08-28 NOTE — ED NOTES
Bedside report received from off going RN/tech: Nella, assumed care of patient.  POC discussed with patient. Call light within reach, all needs addressed at this time.       Fall risk interventions in place: Not Applicable (all applicable per [place] Fall risk assessment)   Continuous monitoring: Pulse Ox or Blood Pressure  IVF/IV medications: Not Applicable   Oxygen: Room Air  Bedside sitter: Not Applicable   Isolation: Not Applicable    Sitter moved to Peds ED. No sitter for pt.

## 2024-08-28 NOTE — DISCHARGE INSTRUCTIONS
Stop drinking so much alcohol or you are going to die!!  Go seek some help for rehab at [facility].

## 2024-08-29 NOTE — DISCHARGE SUMMARY
ED Observation Discharge Summary    Patient:Sivakumar Levi  Patient : 1995  Patient MRN: [id number]  Patient PCP: Pcp Pt States None    Admit Date: 2024  Discharge Date and Time: 24 02:00 am  Discharge Diagnosis: Acute alcohol intoxication with chronic alcohol abuse  Discharge Attending: Marcie Winkler D.O.  Discharge Service: ED Observation    ED Course  Sivakumar is a 28 y.o. male who was evaluated at [facility] for acute alcohol intoxication, patient is here regularly for similar problems, he had been evaluated by Dr. Claudio and care was signed out to me pending patient's clinical improvement.  Nursing staff notified me that the patient was up and walking and would like to go home.  He is stable and not ataxic and will be discharged in stable condition with plans to follow-up outpatient for his alcohol intoxication at my request.    Discharge Exam:  /63   Pulse 83   Temp 36.3 °C (97.3 °F) (Temporal)   Resp 16   Wt 64.4 kg (142 lb)   SpO2 95%   BMI 20.97 kg/m² .    Constitutional: Awake and alert. Nontoxic  HENT:  Grossly normal  Eyes: Grossly normal  Neck: Normal range of motion  Cardiovascular: Normal heart rate   Thorax & Lungs: No respiratory distress  Abdomen: Nontender  Skin:  No pathologic rash.   Extremities: Well perfused  Psychiatric: Affect normal    Labs  Results for orders placed or performed during the hospital encounter of 24   Blood Alcohol   Result Value Ref Range    Diagnostic Alcohol 453.1 (HH) <10.1 mg/dL   CBC WITH DIFFERENTIAL   Result Value Ref Range    WBC 15.3 (H) 4.8 - 10.8 K/uL    RBC 4.71 4.70 - 6.10 M/uL    Hemoglobin 15.0 14.0 - 18.0 g/dL    Hematocrit 43.2 42.0 - 52.0 %    MCV 91.7 81.4 - 97.8 fL    MCH 31.8 27.0 - 33.0 pg    MCHC 34.7 32.3 - 36.5 g/dL    RDW 51.0 (H) 35.9 - 50.0 fL    Platelet Count 340 164 - 446 K/uL    MPV 9.2 9.0 - 12.9 fL    Neutrophils-Polys 81.00 (H) 44.00 - 72.00 %    Lymphocytes 15.00 (L) 22.00 -  41.00 %    Monocytes 2.90 0.00 - 13.40 %    Eosinophils 0.30 0.00 - 6.90 %    Basophils 0.40 0.00 - 1.80 %    Immature Granulocytes 0.40 0.00 - 0.90 %    Nucleated RBC 0.00 0.00 - 0.20 /100 WBC    Neutrophils (Absolute) 12.36 (H) 1.82 - 7.42 K/uL    Lymphs (Absolute) 2.29 1.00 - 4.80 K/uL    Monos (Absolute) 0.44 0.00 - 0.85 K/uL    Eos (Absolute) 0.04 0.00 - 0.51 K/uL    Baso (Absolute) 0.06 0.00 - 0.12 K/uL    Immature Granulocytes (abs) 0.06 0.00 - 0.11 K/uL    NRBC (Absolute) 0.00 K/uL   COMP METABOLIC PANEL   Result Value Ref Range    Sodium 146 (H) 135 - 145 mmol/L    Potassium 3.4 (L) 3.6 - 5.5 mmol/L    Chloride 107 96 - 112 mmol/L    Co2 25 20 - 33 mmol/L    Anion Gap 14.0 7.0 - 16.0    Glucose 116 (H) 65 - 99 mg/dL    Bun 6 (L) 8 - 22 mg/dL    Creatinine 0.68 0.50 - 1.40 mg/dL    Calcium 8.5 8.5 - 10.5 mg/dL    Correct Calcium 8.9 8.5 - 10.5 mg/dL    AST(SGOT) 43 12 - 45 U/L    ALT(SGPT) 29 2 - 50 U/L    Alkaline Phosphatase 155 (H) 30 - 99 U/L    Total Bilirubin 0.2 0.1 - 1.5 mg/dL    Albumin 3.5 3.2 - 4.9 g/dL    Total Protein 7.1 6.0 - 8.2 g/dL    Globulin 3.6 (H) 1.9 - 3.5 g/dL    A-G Ratio 1.0 g/dL   ESTIMATED GFR   Result Value Ref Range    GFR (CKD-EPI) 130 >60 mL/min/1.73 m 2       Radiology  No orders to display       Medications:   Discharge Medication List as of 8/28/2024  1:56 AM          My final assessment includes acute alcohol intoxication with chronic alcohol abuse  Upon Reevaluation, the patient's condition has: Improved; and will be discharged.    Patient discharged from ED Observation status at 02:00am (Time) 8/28/24 (Date).     Total time spent on this ED Observation discharge encounter is > 30 Minutes    Electronically signed by: Marcie Winkler D.O., 8/29/2024 8:58 AM

## 2024-09-29 ENCOUNTER — PHARMACY VISIT (OUTPATIENT)
Dept: PHARMACY | Facility: MEDICAL CENTER | Age: 29
End: 2024-09-29
Payer: COMMERCIAL

## 2024-09-29 ENCOUNTER — HOSPITAL ENCOUNTER (EMERGENCY)
Facility: MEDICAL CENTER | Age: 29
End: 2024-09-29
Attending: EMERGENCY MEDICINE
Payer: MEDICAID

## 2024-09-29 VITALS
HEART RATE: 89 BPM | WEIGHT: 154.98 LBS | BODY MASS INDEX: 23.49 KG/M2 | HEIGHT: 68 IN | OXYGEN SATURATION: 94 % | RESPIRATION RATE: 16 BRPM | SYSTOLIC BLOOD PRESSURE: 130 MMHG | TEMPERATURE: 97.9 F | DIASTOLIC BLOOD PRESSURE: 86 MMHG

## 2024-09-29 DIAGNOSIS — H60.312 ACUTE DIFFUSE OTITIS EXTERNA OF LEFT EAR: ICD-10-CM

## 2024-09-29 DIAGNOSIS — H92.03 OTALGIA OF BOTH EARS: ICD-10-CM

## 2024-09-29 DIAGNOSIS — H61.21 IMPACTED CERUMEN OF RIGHT EAR: ICD-10-CM

## 2024-09-29 PROCEDURE — 99282 EMERGENCY DEPT VISIT SF MDM: CPT

## 2024-09-29 PROCEDURE — RXMED WILLOW AMBULATORY MEDICATION CHARGE: Performed by: EMERGENCY MEDICINE

## 2024-09-29 RX ORDER — NEOMYCIN SULFATE, POLYMYXIN B SULFATE AND HYDROCORTISONE 10; 3.5; 1 MG/ML; MG/ML; [USP'U]/ML
4 SUSPENSION/ DROPS AURICULAR (OTIC) 4 TIMES DAILY
Qty: 10 ML | Refills: 0 | Status: ACTIVE | OUTPATIENT
Start: 2024-09-29 | End: 2024-10-06

## 2024-09-29 RX ORDER — NAPROXEN 500 MG/1
500 TABLET ORAL 2 TIMES DAILY WITH MEALS
Qty: 20 TABLET | Refills: 0 | Status: SHIPPED | OUTPATIENT
Start: 2024-09-29 | End: 2024-10-09

## 2024-09-29 ASSESSMENT — FIBROSIS 4 INDEX: FIB4 SCORE: 0.63

## 2024-09-30 NOTE — ED PROVIDER NOTES
ED Provider Note    CHIEF COMPLAINT  Chief Complaint   Patient presents with    Ear Pain     Left ear     EXTERNAL RECORDS REVIEWED  Her ER notes were reviewed    HPI/ROS  LIMITATION TO HISTORY   Select: : None  OUTSIDE HISTORIAN(S):  none    Sivakumar Levi is a 28 y.o. male who presents emergency room for evaluation of bilateral ear pain.  Patient states that he has had several days possibly even a week and a half of developing fullness in his bilateral ears.  Believes that this started as fullness with wax and decreased hearing throughout.  This has become worsening on the left side with a small amount of drainage.  He does not have headaches, no facial tenderness, no difficulty speaking, no fevers or chills and overall he says that he is uncomfortable but has been dealing with this for several days and is checking himself into the [facility] rehab facility I would like to have some medications to help with his pain while he goes through substance abuse counseling.    PAST MEDICAL HISTORY   Denies history of diabetes.    SURGICAL HISTORY   has a past surgical history that includes other and irrigation & debridement general (Left, 6/11/2024).    FAMILY HISTORY  History reviewed. No pertinent family history.    SOCIAL HISTORY  Social History     Tobacco Use    Smoking status: Former     Types: Cigarettes    Smokeless tobacco: Never   Vaping Use    Vaping status: Former   Substance and Sexual Activity    Alcohol use: Yes     Comment: None since 12/17/23; previous hx of ETOH abuse    Drug use: Not Currently    Sexual activity: Not on file       CURRENT MEDICATIONS  Home Medications       Reviewed by Melita Riggs R.N. (Registered Nurse) on 09/29/24 at 2109  Med List Status: Not Addressed     Medication Last Dose Status        Patient Baudilio Taking any Medications                           ALLERGIES  No Known Allergies    PHYSICAL EXAM  VITAL SIGNS: /86   Pulse 89   Temp 36.6 °C (97.9 °F) (Temporal)  "  Resp 16   Ht 1.727 m (5' 8\")   Wt 70.3 kg (154 lb 15.7 oz)   SpO2 94%   BMI 23.57 kg/m²    Genl: M sitting in chair comfortably, speaking clearly, appears in very mild distress   Head: NC/AT   ENT: Mucous membranes moist, posterior pharynx clear, uvula midline, nares patent bilaterally.  Right-sided TM is obscured secondary to to an acute cerumen impaction.  Ear canal is of normal caliber and there is no discharge.  The left TM is visualized though the external auditory canal is erythematous and subtle amounts of purulent drainage.  No pain with palpation of the mastoid prominence.  No abnormalities of the external ear structures.  Eyes: Normal sclera, pupils equal round reactive to light  Neck: Supple, FROM, no LAD appreciated   Pulmonary: Lungs are clear to auscultation bilaterally  CV:  RRR, no murmur appreciated  Abdomen: soft, NT/ND; no rebound/guarding  Musculoskeletal: Pain free ROM of the neck. Moving upper and lower extremities in spontaneous and coordinated fashion  Neuro: A&Ox4 (person, place, time, situation), speech fluent, gait steady, no focal deficits appreciated    EKG/LABS  none    RADIOLOGY/PROCEDURES   none    COURSE & MEDICAL DECISION MAKING    ASSESSMENT, COURSE AND PLAN  Care Narrative: Patient presents emergency room for symptoms as described above.  The patient is nontoxic, has bilateral otalgia and appears to have some elements of an acute cerumen obstruction on the right side with progression to otitis externa on the left side.  Likely he had bilateral cerumen impactions and the irritation in the canal has led to acute infection.  He has no signs of malignant otitis, no immunosuppression or diabetic history and he is afebrile with no tachycardia here.  He has no impediment and ranging of his head and neck, no evidence of other acute bacterial infection on his head neck exam.  He will be treated with Debrox for his dissolving of the existing cerumen plug on the right side and " polymyxin drops on the left side for treatment of his acute otitis externa.  TM appears intact at this time and drops are suitable for ongoing therapy.  He is discharged home in stable condition.    DISPOSITION AND DISCUSSIONS  I have discussed management of the patient with the following physicians and ELLIS's:  none    Discussion of management with other QHP or appropriate source(s): None     Escalation of care considered, and ultimately not performed:IV fluids, Laboratory analysis, and diagnostic imaging    Barriers to care at this time, including but not limited to: Patient does not have established PCP.     Decision tools and prescription drugs considered including, but not limited to:  polymyxin drops and debro .    FINAL DIAGNOSIS  1. Otalgia of both ears    2. Impacted cerumen of right ear    3. Acute diffuse otitis externa of left ear      Electronically signed by: Darshan Navarro M.D., 9/29/2024 10:15 PM

## 2024-09-30 NOTE — ED NOTES
Pt educated on discharge instructions. All questions & concerns addressed. Vitals re-assessed and at pt's baseline.     No IV to d/c    Pt requesting to meet with PAR regarding [employer] form. PAR aware.

## 2024-09-30 NOTE — ED TRIAGE NOTES
Vitals:    09/29/24 2100   BP: 126/88   Pulse: 93   Resp: 15   Temp: 36.8 °C (98.3 °F)   SpO2: 93%     Chief Complaint   Patient presents with    Ear Pain     Left ear     Pt reports a clogged left ear which is causing him pain in his ear and radiating to his head/neck.     Pt is ambulatory to and from triage and is alert and oriented x 4.

## (undated) DEVICE — ELECTRODE DUAL RETURN W/ CORD - (50/PK)

## (undated) DEVICE — SUTURE GENERAL

## (undated) DEVICE — TUBING CLEARLINK DUO-VENT - C-FLO (48EA/CA)

## (undated) DEVICE — SET LEADWIRE 5 LEAD BEDSIDE DISPOSABLE ECG (1SET OF 5/EA)

## (undated) DEVICE — SENSOR OXIMETER ADULT SPO2 RD SET (20EA/BX)

## (undated) DEVICE — COVER LIGHT HANDLE ALC PLUS DISP (18EA/BX)

## (undated) DEVICE — SUTURE 3-0 ETHILON FS-1 - (36/BX) 30 INCH

## (undated) DEVICE — SODIUM CHL IRRIGATION 0.9% 1000ML (12EA/CA)

## (undated) DEVICE — LACTATED RINGERS INJ 1000 ML - (14EA/CA 60CA/PF)

## (undated) DEVICE — SUCTION INSTRUMENT YANKAUER BULBOUS TIP W/O VENT (50EA/CA)

## (undated) DEVICE — GOWN WARMING STANDARD FLEX - (30/CA)

## (undated) DEVICE — CANISTER SUCTION 3000ML MECHANICAL FILTER AUTO SHUTOFF MEDI-VAC NONSTERILE LF DISP (40EA/CA)

## (undated) DEVICE — HANDPIECE 10FT INTPLS SCT PLS IRRIGATION HAND CONTROL SET (6/PK)

## (undated) DEVICE — TIP INTPLS HFLO ML ORFC BTRY - (12/CS) FOR SURGILAV

## (undated) DEVICE — SET EXTENSION WITH 2 PORTS (48EA/CA) ***PART #2C8610 IS A SUBSTITUTE*****